# Patient Record
Sex: MALE | Race: BLACK OR AFRICAN AMERICAN | Employment: OTHER | URBAN - METROPOLITAN AREA
[De-identification: names, ages, dates, MRNs, and addresses within clinical notes are randomized per-mention and may not be internally consistent; named-entity substitution may affect disease eponyms.]

---

## 2018-05-21 ENCOUNTER — HOSPITAL ENCOUNTER (EMERGENCY)
Age: 63
Discharge: HOME | End: 2018-05-21
Attending: EMERGENCY MEDICINE
Payer: COMMERCIAL

## 2018-05-21 ENCOUNTER — APPOINTMENT (OUTPATIENT)
Dept: GENERAL RADIOLOGY | Age: 63
End: 2018-05-21
Attending: EMERGENCY MEDICINE
Payer: COMMERCIAL

## 2018-05-21 VITALS
WEIGHT: 248 LBS | HEIGHT: 69 IN | TEMPERATURE: 98.2 F | SYSTOLIC BLOOD PRESSURE: 107 MMHG | RESPIRATION RATE: 14 BRPM | DIASTOLIC BLOOD PRESSURE: 76 MMHG | BODY MASS INDEX: 36.73 KG/M2 | OXYGEN SATURATION: 97 % | HEART RATE: 106 BPM

## 2018-05-21 DIAGNOSIS — E87.70 HYPERVOLEMIA, UNSPECIFIED HYPERVOLEMIA TYPE: ICD-10-CM

## 2018-05-21 DIAGNOSIS — R06.00 DYSPNEA, UNSPECIFIED TYPE: Primary | ICD-10-CM

## 2018-05-21 DIAGNOSIS — J20.9 ACUTE BRONCHITIS, UNSPECIFIED ORGANISM: ICD-10-CM

## 2018-05-21 LAB
ALBUMIN SERPL-MCNC: 3.8 G/DL (ref 3.4–5)
ALBUMIN/GLOB SERPL: 1.1 {RATIO} (ref 0.8–1.7)
ALP SERPL-CCNC: 24 U/L (ref 45–117)
ALT SERPL-CCNC: 32 U/L (ref 16–61)
ANION GAP SERPL CALC-SCNC: 7 MMOL/L (ref 3–18)
AST SERPL-CCNC: 17 U/L (ref 15–37)
ATRIAL RATE: 100 BPM
BASOPHILS # BLD: 0 K/UL (ref 0–0.06)
BASOPHILS NFR BLD: 1 % (ref 0–2)
BILIRUB DIRECT SERPL-MCNC: 0.1 MG/DL (ref 0–0.2)
BILIRUB SERPL-MCNC: 0.5 MG/DL (ref 0.2–1)
BNP SERPL-MCNC: 3364 PG/ML (ref 0–900)
BUN SERPL-MCNC: 13 MG/DL (ref 7–18)
BUN/CREAT SERPL: 11 (ref 12–20)
CALCIUM SERPL-MCNC: 9.1 MG/DL (ref 8.5–10.1)
CALCULATED P AXIS, ECG09: 58 DEGREES
CALCULATED R AXIS, ECG10: 21 DEGREES
CALCULATED T AXIS, ECG11: 95 DEGREES
CHLORIDE SERPL-SCNC: 106 MMOL/L (ref 100–108)
CK MB CFR SERPL CALC: 0.8 % (ref 0–4)
CK MB SERPL-MCNC: 2.1 NG/ML (ref 5–25)
CK SERPL-CCNC: 248 U/L (ref 39–308)
CO2 SERPL-SCNC: 26 MMOL/L (ref 21–32)
CREAT SERPL-MCNC: 1.15 MG/DL (ref 0.6–1.3)
D DIMER PPP FEU-MCNC: 0.49 UG/ML(FEU)
DIAGNOSIS, 93000: NORMAL
DIFFERENTIAL METHOD BLD: ABNORMAL
EOSINOPHIL # BLD: 0.1 K/UL (ref 0–0.4)
EOSINOPHIL NFR BLD: 2 % (ref 0–5)
ERYTHROCYTE [DISTWIDTH] IN BLOOD BY AUTOMATED COUNT: 12.7 % (ref 11.6–14.5)
GLOBULIN SER CALC-MCNC: 3.4 G/DL (ref 2–4)
GLUCOSE SERPL-MCNC: 98 MG/DL (ref 74–99)
HCT VFR BLD AUTO: 38.7 % (ref 36–48)
HGB BLD-MCNC: 13.6 G/DL (ref 13–16)
LYMPHOCYTES # BLD: 2.1 K/UL (ref 0.9–3.6)
LYMPHOCYTES NFR BLD: 41 % (ref 21–52)
MCH RBC QN AUTO: 30.9 PG (ref 24–34)
MCHC RBC AUTO-ENTMCNC: 35.1 G/DL (ref 31–37)
MCV RBC AUTO: 88 FL (ref 74–97)
MONOCYTES # BLD: 0.5 K/UL (ref 0.05–1.2)
MONOCYTES NFR BLD: 10 % (ref 3–10)
NEUTS SEG # BLD: 2.4 K/UL (ref 1.8–8)
NEUTS SEG NFR BLD: 46 % (ref 40–73)
P-R INTERVAL, ECG05: 168 MS
PLATELET # BLD AUTO: 259 K/UL (ref 135–420)
PMV BLD AUTO: 10.4 FL (ref 9.2–11.8)
POTASSIUM SERPL-SCNC: 4.4 MMOL/L (ref 3.5–5.5)
PROT SERPL-MCNC: 7.2 G/DL (ref 6.4–8.2)
Q-T INTERVAL, ECG07: 376 MS
QRS DURATION, ECG06: 100 MS
QTC CALCULATION (BEZET), ECG08: 485 MS
RBC # BLD AUTO: 4.4 M/UL (ref 4.7–5.5)
SODIUM SERPL-SCNC: 139 MMOL/L (ref 136–145)
TROPONIN I SERPL-MCNC: <0.02 NG/ML (ref 0–0.04)
VENTRICULAR RATE, ECG03: 100 BPM
WBC # BLD AUTO: 5.2 K/UL (ref 4.6–13.2)

## 2018-05-21 PROCEDURE — 83880 ASSAY OF NATRIURETIC PEPTIDE: CPT | Performed by: EMERGENCY MEDICINE

## 2018-05-21 PROCEDURE — 94640 AIRWAY INHALATION TREATMENT: CPT

## 2018-05-21 PROCEDURE — 74011000250 HC RX REV CODE- 250: Performed by: EMERGENCY MEDICINE

## 2018-05-21 PROCEDURE — 93005 ELECTROCARDIOGRAM TRACING: CPT

## 2018-05-21 PROCEDURE — 71045 X-RAY EXAM CHEST 1 VIEW: CPT

## 2018-05-21 PROCEDURE — 80076 HEPATIC FUNCTION PANEL: CPT | Performed by: EMERGENCY MEDICINE

## 2018-05-21 PROCEDURE — 85025 COMPLETE CBC W/AUTO DIFF WBC: CPT | Performed by: EMERGENCY MEDICINE

## 2018-05-21 PROCEDURE — 80048 BASIC METABOLIC PNL TOTAL CA: CPT | Performed by: EMERGENCY MEDICINE

## 2018-05-21 PROCEDURE — 82550 ASSAY OF CK (CPK): CPT | Performed by: EMERGENCY MEDICINE

## 2018-05-21 PROCEDURE — 96374 THER/PROPH/DIAG INJ IV PUSH: CPT

## 2018-05-21 PROCEDURE — 77030029684 HC NEB SM VOL KT MONA -A

## 2018-05-21 PROCEDURE — 74011250636 HC RX REV CODE- 250/636: Performed by: EMERGENCY MEDICINE

## 2018-05-21 PROCEDURE — 99284 EMERGENCY DEPT VISIT MOD MDM: CPT

## 2018-05-21 PROCEDURE — 94762 N-INVAS EAR/PLS OXIMTRY CONT: CPT

## 2018-05-21 PROCEDURE — 85379 FIBRIN DEGRADATION QUANT: CPT | Performed by: EMERGENCY MEDICINE

## 2018-05-21 RX ORDER — FUROSEMIDE 40 MG/1
40 TABLET ORAL DAILY
Qty: 7 TAB | Refills: 0 | Status: SHIPPED | OUTPATIENT
Start: 2018-05-21 | End: 2018-05-28

## 2018-05-21 RX ORDER — FUROSEMIDE 10 MG/ML
40 INJECTION INTRAMUSCULAR; INTRAVENOUS
Status: COMPLETED | OUTPATIENT
Start: 2018-05-21 | End: 2018-05-21

## 2018-05-21 RX ORDER — PREDNISONE 10 MG/1
TABLET ORAL
Qty: 1 PACKAGE | Refills: 0 | Status: SHIPPED | OUTPATIENT
Start: 2018-05-21 | End: 2018-07-13 | Stop reason: ALTCHOICE

## 2018-05-21 RX ORDER — DOXYCYCLINE 100 MG/1
100 CAPSULE ORAL 2 TIMES DAILY
Qty: 14 CAP | Refills: 0 | Status: SHIPPED | OUTPATIENT
Start: 2018-05-21 | End: 2018-05-28

## 2018-05-21 RX ORDER — IPRATROPIUM BROMIDE AND ALBUTEROL SULFATE 2.5; .5 MG/3ML; MG/3ML
3 SOLUTION RESPIRATORY (INHALATION)
Status: COMPLETED | OUTPATIENT
Start: 2018-05-21 | End: 2018-05-21

## 2018-05-21 RX ORDER — ALBUTEROL SULFATE 90 UG/1
2 AEROSOL, METERED RESPIRATORY (INHALATION)
Qty: 1 INHALER | Refills: 0 | Status: SHIPPED | OUTPATIENT
Start: 2018-05-21 | End: 2018-05-26

## 2018-05-21 RX ADMIN — IPRATROPIUM BROMIDE AND ALBUTEROL SULFATE 3 ML: .5; 3 SOLUTION RESPIRATORY (INHALATION) at 13:12

## 2018-05-21 RX ADMIN — FUROSEMIDE 40 MG: 10 INJECTION, SOLUTION INTRAMUSCULAR; INTRAVENOUS at 13:12

## 2018-05-21 NOTE — DISCHARGE INSTRUCTIONS
Shortness of Breath: Care Instructions  Your Care Instructions  Shortness of breath has many causes. Sometimes conditions such as anxiety can lead to shortness of breath. Some people get mild shortness of breath when they exercise. Trouble breathing also can be a symptom of a serious problem, such as asthma, lung disease, emphysema, heart problems, and pneumonia. If your shortness of breath continues, you may need tests and treatment. Watch for any changes in your breathing and other symptoms. Follow-up care is a key part of your treatment and safety. Be sure to make and go to all appointments, and call your doctor if you are having problems. It's also a good idea to know your test results and keep a list of the medicines you take. How can you care for yourself at home? · Do not smoke or allow others to smoke around you. If you need help quitting, talk to your doctor about stop-smoking programs and medicines. These can increase your chances of quitting for good. · Get plenty of rest and sleep. · Take your medicines exactly as prescribed. Call your doctor if you think you are having a problem with your medicine. · Find healthy ways to deal with stress. ¨ Exercise daily. ¨ Get plenty of sleep. ¨ Eat regularly and well. When should you call for help? Call 911 anytime you think you may need emergency care. For example, call if:  ? · You have severe shortness of breath. ? · You have symptoms of a heart attack. These may include:  ¨ Chest pain or pressure, or a strange feeling in the chest.  ¨ Sweating. ¨ Shortness of breath. ¨ Nausea or vomiting. ¨ Pain, pressure, or a strange feeling in the back, neck, jaw, or upper belly or in one or both shoulders or arms. ¨ Lightheadedness or sudden weakness. ¨ A fast or irregular heartbeat. After you call 911, the  may tell you to chew 1 adult-strength or 2 to 4 low-dose aspirin. Wait for an ambulance. Do not try to drive yourself.    ?Call your doctor now or seek immediate medical care if:  ? · Your shortness of breath gets worse or you start to wheeze. Wheezing is a high-pitched sound when you breathe. ? · You wake up at night out of breath or have to prop your head up on several pillows to breathe. ? · You are short of breath after only light activity or while at rest.   ? Watch closely for changes in your health, and be sure to contact your doctor if:  ? · You do not get better over the next 1 to 2 days. Where can you learn more? Go to http://brandt-lore.info/. Enter S780 in the search box to learn more about \"Shortness of Breath: Care Instructions. \"  Current as of: May 12, 2017  Content Version: 11.4  © 2672-4711 [employer]. Care instructions adapted under license by [employer] (which disclaims liability or warranty for this information). If you have questions about a medical condition or this instruction, always ask your healthcare professional. [place] [address] any warranty or liability for your use of this information. Learning About Fluid Overload  What is fluid overload? Fluid overload means that your body has too much water. The extra fluid in your body can raise your blood pressure and force your heart to work harder. It can also make it hard for you to breathe. Most of your body is made up of water. The body uses minerals like sodium and potassium to help organs such as your heart, kidneys, and liver balance how much water you need. For example, the heart pumps blood to move water around the body. And the kidneys work to get rid of the water that the body doesn't need. Health conditions like kidney disease, heart failure, and cirrhosis can cause fluid overload. Other things can cause extra fluid to build up. IV fluids, some medicines, too much salt (sodium) from food, and certain medical treatments can sometimes cause this fluid increase.   What are the symptoms? Some of the most common symptoms are:  · Gaining weight over a short period of time. · Swelling in the ankles or legs. · Shortness of breath. How is it treated? The goal of treatment is to remove the extra fluid in your body. Your treatment will depend on the cause. Your doctor may:  · Give you medicines, such as diuretics (also called \"water pills\"). They help your body get rid of the extra fluid. · Restrict your fluid or salt intake. Follow-up care is a key part of your treatment and safety. Be sure to make and go to all appointments, and call your doctor if you are having problems. It's also a good idea to know your test results and keep a list of the medicines you take. Where can you learn more? Go to http://brandt-lore.info/. Enter O110 in the search box to learn more about \"Learning About Fluid Overload. \"  Current as of: September 21, 2016  Content Version: 11.4  © 6136-1226 [employer]. Care instructions adapted under license by [employer] (which disclaims liability or warranty for this information). If you have questions about a medical condition or this instruction, always ask your healthcare professional. [place] [address] any warranty or liability for your use of this information.

## 2018-05-21 NOTE — ED NOTES
Assumed care of patient at this time, patient states that he feels better call bell within reach no additional wants or needs noted.

## 2018-05-21 NOTE — ED NOTES
I performed a brief evaluation, including history and physical, of the patient here in triage and I have determined that pt will need further treatment and evaluation from the main side ER physician. I have placed initial orders to help in expediting patients care.      May 21, 2018 at 10:38 AM - A[facility]        Visit Vitals    /85 (BP 1 Location: Left arm, BP Patient Position: At rest)    Pulse (!) 106    Temp 98.2 °F (36.8 °C)    Resp 14    Ht 5' 9\" (1.753 m)    Wt 112.5 kg (248 lb)    SpO2 99%    BMI 36.62 kg/m2

## 2018-05-21 NOTE — ED NOTES
Patient given medication per [facility]. Patient denies pain currently call bell within reach no additional wants or needs noted at this time.

## 2018-05-21 NOTE — ED NOTES
Patient is assisted to the bathroom at this time patient's gait is steady patient denies pain. No SOB noted.

## 2018-05-21 NOTE — ED NOTES
I have reviewed discharge instructions with the patient. The patient verbalized understanding. Patient armband removed and shredded.  Patient is made aware to follow-up with provider patient verbalizes understanding

## 2018-05-21 NOTE — ED PROVIDER NOTES
EMERGENCY DEPARTMENT HISTORY AND PHYSICAL EXAM    10:50 AM      Date: 5/21/2018  Patient Name: Marisa Zapata    History of Presenting Illness     Chief Complaint   Patient presents with    Shortness of Breath         History Provided By: Patient    Chief Complaint: SOB  Duration:  1 month  Timing:  Gradual and Worsening  Severity: Moderate  Modifying Factors: Made worse with walking, laying down   Associated Symptoms: dyspnea on exertion, orthopnea, intermittent cough      Additional History (Context): Marisa Zapata is a 60 y.o. male with diabetes who presents with complaints of gradually worsening moderate SOB onset the approximately one month. The pt stated that he has trouble sleeping. He says that he just \"dozes\" off and then is startled out of his sleep from trying to catch his breath. He reports dyspnea on exertion and can walk approximately 50 yards before the SOB occurs. He noted that when he inhales sometimes he hears a \"gurgling\" sound and has an intermittent cough productive of yellow sputum. He is not experiencing any nausea, vomiting, or diaphoresis during the SOB episodes. Patient is a former smoker and denies alcohol use. PCP: Xiomara Salazar MD    Current Outpatient Prescriptions   Medication Sig Dispense Refill    predniSONE (STERAPRED DS) 10 mg dose pack 6 day dose pack per package instructions 1 Package 0    albuterol (PROVENTIL HFA, VENTOLIN HFA, PROAIR HFA) 90 mcg/actuation inhaler Take 2 Puffs by inhalation every four (4) hours as needed for Wheezing or Shortness of Breath for up to 5 days. 1 Inhaler 0    furosemide (LASIX) 40 mg tablet Take 1 Tab by mouth daily for 7 days. 7 Tab 0    doxycycline (MONODOX) 100 mg capsule Take 1 Cap by mouth two (2) times a day for 7 days. 14 Cap 0    acetaminophen (TYLENOL ARTHRITIS PAIN) 650 mg CR tablet Take 650 mg by mouth daily.  Take 2 daily      metFORMIN (GLUCOPHAGE) 1,000 mg tablet Take 1,000 mg by mouth two (2) times daily (with meals).  simvastatin (ZOCOR) 20 mg tablet Take  by mouth nightly. Past History     Past Medical History:  Past Medical History:   Diagnosis Date    Arthritis     Nonspecific abnormal electrocardiogram (ECG) (EKG)     Obesity, unspecified     Other and unspecified hyperlipidemia     Other primary cardiomyopathies     Shortness of breath     Type II or unspecified type diabetes mellitus without mention of complication, not stated as uncontrolled        Past Surgical History:  History reviewed. No pertinent surgical history. Family History:  History reviewed. No pertinent family history. Social History:  Social History   Substance Use Topics    Smoking status: Former Smoker     Quit date: 12/6/1998    Smokeless tobacco: Never Used    Alcohol use No      Comment: former heavy drinker       Allergies:  No Known Allergies      Review of Systems     Review of Systems   Constitutional: Negative for activity change, chills, fatigue and fever. HENT: Negative for congestion, rhinorrhea and sneezing. Eyes: Negative for visual disturbance. Respiratory: Positive for cough (intermittent, productive, yellow sputum) and shortness of breath. Negative for chest tightness. Cardiovascular: Negative for chest pain and palpitations. Gastrointestinal: Negative for abdominal pain, diarrhea, nausea and vomiting. Genitourinary: Negative for dysuria and hematuria. Musculoskeletal: Negative for back pain and neck pain. Skin: Negative for rash. Neurological: Negative for dizziness, weakness, light-headedness and numbness. All other systems reviewed and are negative. Physical Exam     Visit Vitals    /85 (BP 1 Location: Left arm, BP Patient Position: At rest)    Pulse (!) 106    Temp 98.2 °F (36.8 °C)    Resp 14    Ht 5' 9\" (1.753 m)    Wt 112.5 kg (248 lb)    SpO2 99%    BMI 36.62 kg/m2     Physical Exam   Constitutional: He is oriented to person, place, and time.  He appears well-developed and well-nourished. No distress. No distress, eating chips and non diet soda    HENT:   Head: Normocephalic and atraumatic. Right Ear: External ear normal.   Left Ear: External ear normal.   Nose: Nose normal.   Mouth/Throat: Oropharynx is clear and moist.   Eyes: Conjunctivae and EOM are normal. Pupils are equal, round, and reactive to light. No scleral icterus. Neck: Normal range of motion. Neck supple. No JVD present. No tracheal deviation present. No thyromegaly present. Cardiovascular: Regular rhythm and intact distal pulses. Exam reveals gallop. Exam reveals no friction rub. No murmur heard. Tachy ? S3   Pulmonary/Chest: Effort normal and breath sounds normal. He exhibits no tenderness. Abdominal: Soft. Bowel sounds are normal. He exhibits no distension. There is no tenderness. There is no rebound and no guarding. Musculoskeletal: Normal range of motion. He exhibits no edema or tenderness. Lymphadenopathy:     He has no cervical adenopathy. Neurological: He is alert and oriented to person, place, and time. No cranial nerve deficit. Coordination normal.   No sensory loss, Gait normal, Motor 5/5   Skin: Skin is warm and dry. Psychiatric: He has a normal mood and affect. His behavior is normal. Judgment and thought content normal.   Nursing note and vitals reviewed. Diagnostic Study Results     Labs -  Recent Results (from the past 12 hour(s))   CBC WITH AUTOMATED DIFF    Collection Time: 05/21/18 11:01 AM   Result Value Ref Range    WBC 5.2 4.6 - 13.2 K/uL    RBC 4.40 (L) 4.70 - 5.50 M/uL    HGB 13.6 13.0 - 16.0 g/dL    HCT 38.7 36.0 - 48.0 %    MCV 88.0 74.0 - 97.0 FL    MCH 30.9 24.0 - 34.0 PG    MCHC 35.1 31.0 - 37.0 g/dL    RDW 12.7 11.6 - 14.5 %    PLATELET 211 938 - 511 K/uL    MPV 10.4 9.2 - 11.8 FL    NEUTROPHILS 46 40 - 73 %    LYMPHOCYTES 41 21 - 52 %    MONOCYTES 10 3 - 10 %    EOSINOPHILS 2 0 - 5 %    BASOPHILS 1 0 - 2 %    ABS.  NEUTROPHILS 2.4 1.8 - 8.0 K/UL    ABS. LYMPHOCYTES 2.1 0.9 - 3.6 K/UL    ABS. MONOCYTES 0.5 0.05 - 1.2 K/UL    ABS. EOSINOPHILS 0.1 0.0 - 0.4 K/UL    ABS. BASOPHILS 0.0 0.0 - 0.06 K/UL    DF AUTOMATED     METABOLIC PANEL, BASIC    Collection Time: 05/21/18 11:01 AM   Result Value Ref Range    Sodium 139 136 - 145 mmol/L    Potassium 4.4 3.5 - 5.5 mmol/L    Chloride 106 100 - 108 mmol/L    CO2 26 21 - 32 mmol/L    Anion gap 7 3.0 - 18 mmol/L    Glucose 98 74 - 99 mg/dL    BUN 13 7.0 - 18 MG/DL    Creatinine 1.15 0.6 - 1.3 MG/DL    BUN/Creatinine ratio 11 (L) 12 - 20      GFR est AA >60 >60 ml/min/1.73m2    GFR est non-AA >60 >60 ml/min/1.73m2    Calcium 9.1 8.5 - 10.1 MG/DL   CARDIAC PANEL,(CK, CKMB & TROPONIN)    Collection Time: 05/21/18 11:01 AM   Result Value Ref Range     39 - 308 U/L    CK - MB 2.1 <3.6 ng/ml    CK-MB Index 0.8 0.0 - 4.0 %    Troponin-I, Qt. <0.02 0.0 - 0.045 NG/ML   NT-PRO BNP    Collection Time: 05/21/18 11:01 AM   Result Value Ref Range    NT pro-BNP 3364 (H) 0 - 900 PG/ML   HEPATIC FUNCTION PANEL    Collection Time: 05/21/18 11:01 AM   Result Value Ref Range    Protein, total 7.2 6.4 - 8.2 g/dL    Albumin 3.8 3.4 - 5.0 g/dL    Globulin 3.4 2.0 - 4.0 g/dL    A-G Ratio 1.1 0.8 - 1.7      Bilirubin, total 0.5 0.2 - 1.0 MG/DL    Bilirubin, direct 0.1 0.0 - 0.2 MG/DL    Alk.  phosphatase 24 (L) 45 - 117 U/L    AST (SGOT) 17 15 - 37 U/L    ALT (SGPT) 32 16 - 61 U/L   D DIMER    Collection Time: 05/21/18 11:01 AM   Result Value Ref Range    D DIMER 0.49 (H) <0.46 ug/ml(FEU)   EKG, 12 LEAD, INITIAL    Collection Time: 05/21/18 11:37 AM   Result Value Ref Range    Ventricular Rate 100 BPM    Atrial Rate 100 BPM    P-R Interval 168 ms    QRS Duration 100 ms    Q-T Interval 376 ms    QTC Calculation (Bezet) 485 ms    Calculated P Axis 58 degrees    Calculated R Axis 21 degrees    Calculated T Axis 95 degrees    Diagnosis       Normal sinus rhythm  Possible Left atrial enlargement  T wave abnormality, consider anterolateral ischemia  Prolonged QT  Abnormal ECG  When compared with ECG of 11-SEP-2012 15:39,  T wave inversion now evident in Anterior leads  QT has lengthened  Confirmed by Ana Laura Castro MD, --- ([id number]) on 5/21/2018 12:58:08 PM         Radiologic Studies -   XR CHEST PORT   Final Result      XR CHEST PORT  IMPRESSION:      Normal cardiac size without cardiac decompensation.     Increased right perihilar markings, consistent with perihilar infiltrates. Aspiration pneumonitis not excluded    Medical Decision Making   I am the first provider for this patient. I reviewed the vital signs, available nursing notes, past medical history, past surgical history, family history and social history. Vital Signs-Reviewed the patient's vital signs. Pulse Oximetry Analysis -  99% on room air   EKG: Interpreted by the EP. Time Interpreted: 11:37AM   Rate: 100   Rhythm: Normal Sinus Rhythm    Interpretation: Increased QTC, T-wave inversions anterior lateral leads. No STEMI     Records Reviewed: Nursing Notes (Time of Review: 10:50 AM)    ED Course: Progress Notes, Reevaluation, and Consults:  1:20 PM Pt is feeling better with breathing treatment. 3:25 PM Feeling better after 1000mL of diuresis and would like to go home. Will start him on Lasix for 7 days, as well as an inhaler, antibiotics and Prednisone. Patient should follow up with Dr. John Lee and Dr. Jeanette Juarez this week. Have instructed him to return if symptoms worsen or with concern. Provider Notes (Medical Decision Making): Pt is a 60yo male with a hx of cardiomyopathy but last echo with EF 33% with diastolic dysfunction, dyslipidemia, DM presents to the ED with complaint of dyspnea that has been progressive with CARPENTER, orthopnea. Pt HR is elevated and will follow cardiac labs, CXR. Diagnosis     Clinical Impression:   1. Dyspnea, unspecified type    2. Acute bronchitis, unspecified organism    3.  Hypervolemia, unspecified hypervolemia type Disposition: Discharged    Follow-up Information     Follow up With Details Comments Contact Info    [facility] EMERGENCY DEPT  As needed, If symptoms worsen [address] [facility]    Maykel Garza MD Schedule an appointment as soon as possible for a visit in 2 days  [address]      Murray Lew MD Schedule an appointment as soon as possible for a visit in 2 days  [address] [place] [address] [address] [place] [id number]  [phone]             Patient's Medications   Start Taking    ALBUTEROL (PROVENTIL HFA, VENTOLIN HFA, PROAIR HFA) 90 MCG/ACTUATION INHALER    Take 2 Puffs by inhalation every four (4) hours as needed for Wheezing or Shortness of Breath for up to 5 days. DOXYCYCLINE (MONODOX) 100 MG CAPSULE    Take 1 Cap by mouth two (2) times a day for 7 days. FUROSEMIDE (LASIX) 40 MG TABLET    Take 1 Tab by mouth daily for 7 days. PREDNISONE (STERAPRED DS) 10 MG DOSE PACK    6 day dose pack per package instructions   Continue Taking    ACETAMINOPHEN (TYLENOL ARTHRITIS PAIN) 650 MG CR TABLET    Take 650 mg by mouth daily. Take 2 daily    METFORMIN (GLUCOPHAGE) 1,000 MG TABLET    Take 1,000 mg by mouth two (2) times daily (with meals). SIMVASTATIN (ZOCOR) 20 MG TABLET    Take  by mouth nightly.    These Medications have changed    No medications on file   Stop Taking    No medications on file     _______________________________    Attestations:  [address] [place] [address] acting as a scribe for and in the presence of Ayala Bush MD      May 21, 2018 at 10:50 AM    Scribe Attestation:     Suzie Nolan, acting as a scribe for and in the presence of Ayala Bush MD      May 21, 2018 at 11:06 AM        Provider Attestation:      I personally performed the services described in the documentation, reviewed the documentation, as recorded by the scribe in my presence, and it accurately and completely records my words and actions.  May 21, 2018 at 10:50 AM - Jessica German MD    _______________________________

## 2018-05-21 NOTE — ED TRIAGE NOTES
The patient ambulated into Triage, eating chips and drinking a soda. States, \"I'm having shortness of breath. It started last week. \"

## 2018-05-21 NOTE — ED NOTES
Pt states \"It's funny, I have been feeling so terrible, but when I walked into here, I now feel fine\".

## 2018-06-22 ENCOUNTER — HOSPITAL ENCOUNTER (EMERGENCY)
Age: 63
Discharge: HOME | End: 2018-06-22
Attending: EMERGENCY MEDICINE
Payer: COMMERCIAL

## 2018-06-22 ENCOUNTER — APPOINTMENT (OUTPATIENT)
Dept: GENERAL RADIOLOGY | Age: 63
End: 2018-06-22
Attending: EMERGENCY MEDICINE
Payer: COMMERCIAL

## 2018-06-22 VITALS
WEIGHT: 210 LBS | RESPIRATION RATE: 20 BRPM | SYSTOLIC BLOOD PRESSURE: 121 MMHG | HEIGHT: 69 IN | DIASTOLIC BLOOD PRESSURE: 82 MMHG | OXYGEN SATURATION: 100 % | TEMPERATURE: 98.3 F | BODY MASS INDEX: 31.1 KG/M2 | HEART RATE: 92 BPM

## 2018-06-22 DIAGNOSIS — I50.9 ACUTE ON CHRONIC CONGESTIVE HEART FAILURE, UNSPECIFIED HEART FAILURE TYPE (HCC): Primary | ICD-10-CM

## 2018-06-22 DIAGNOSIS — R06.01 ORTHOPNEA: ICD-10-CM

## 2018-06-22 LAB
ANION GAP SERPL CALC-SCNC: 4 MMOL/L (ref 3–18)
ATRIAL RATE: 94 BPM
BASOPHILS # BLD: 0 K/UL (ref 0–0.06)
BASOPHILS NFR BLD: 1 % (ref 0–2)
BNP SERPL-MCNC: 4730 PG/ML (ref 0–900)
BUN SERPL-MCNC: 12 MG/DL (ref 7–18)
BUN/CREAT SERPL: 10 (ref 12–20)
CALCIUM SERPL-MCNC: 8.3 MG/DL (ref 8.5–10.1)
CALCULATED P AXIS, ECG09: 64 DEGREES
CALCULATED R AXIS, ECG10: 24 DEGREES
CALCULATED T AXIS, ECG11: 75 DEGREES
CHLORIDE SERPL-SCNC: 109 MMOL/L (ref 100–108)
CO2 SERPL-SCNC: 29 MMOL/L (ref 21–32)
CREAT SERPL-MCNC: 1.22 MG/DL (ref 0.6–1.3)
DIAGNOSIS, 93000: NORMAL
DIFFERENTIAL METHOD BLD: ABNORMAL
EOSINOPHIL # BLD: 0.1 K/UL (ref 0–0.4)
EOSINOPHIL NFR BLD: 4 % (ref 0–5)
ERYTHROCYTE [DISTWIDTH] IN BLOOD BY AUTOMATED COUNT: 12.8 % (ref 11.6–14.5)
GLUCOSE SERPL-MCNC: 179 MG/DL (ref 74–99)
HCT VFR BLD AUTO: 35.5 % (ref 36–48)
HGB BLD-MCNC: 12.2 G/DL (ref 13–16)
LYMPHOCYTES # BLD: 1.8 K/UL (ref 0.9–3.6)
LYMPHOCYTES NFR BLD: 43 % (ref 21–52)
MCH RBC QN AUTO: 30.2 PG (ref 24–34)
MCHC RBC AUTO-ENTMCNC: 34.4 G/DL (ref 31–37)
MCV RBC AUTO: 87.9 FL (ref 74–97)
MONOCYTES # BLD: 0.4 K/UL (ref 0.05–1.2)
MONOCYTES NFR BLD: 11 % (ref 3–10)
NEUTS SEG # BLD: 1.6 K/UL (ref 1.8–8)
NEUTS SEG NFR BLD: 41 % (ref 40–73)
P-R INTERVAL, ECG05: 176 MS
PLATELET # BLD AUTO: 195 K/UL (ref 135–420)
PMV BLD AUTO: 10 FL (ref 9.2–11.8)
POTASSIUM SERPL-SCNC: 3.9 MMOL/L (ref 3.5–5.5)
Q-T INTERVAL, ECG07: 404 MS
QRS DURATION, ECG06: 94 MS
QTC CALCULATION (BEZET), ECG08: 505 MS
RBC # BLD AUTO: 4.04 M/UL (ref 4.7–5.5)
SODIUM SERPL-SCNC: 142 MMOL/L (ref 136–145)
TROPONIN I SERPL-MCNC: <0.02 NG/ML (ref 0–0.04)
VENTRICULAR RATE, ECG03: 94 BPM
WBC # BLD AUTO: 4 K/UL (ref 4.6–13.2)

## 2018-06-22 PROCEDURE — 83880 ASSAY OF NATRIURETIC PEPTIDE: CPT | Performed by: EMERGENCY MEDICINE

## 2018-06-22 PROCEDURE — 74011250637 HC RX REV CODE- 250/637: Performed by: EMERGENCY MEDICINE

## 2018-06-22 PROCEDURE — 80048 BASIC METABOLIC PNL TOTAL CA: CPT | Performed by: EMERGENCY MEDICINE

## 2018-06-22 PROCEDURE — 93005 ELECTROCARDIOGRAM TRACING: CPT

## 2018-06-22 PROCEDURE — 85025 COMPLETE CBC W/AUTO DIFF WBC: CPT | Performed by: EMERGENCY MEDICINE

## 2018-06-22 PROCEDURE — 74011250636 HC RX REV CODE- 250/636: Performed by: EMERGENCY MEDICINE

## 2018-06-22 PROCEDURE — 99284 EMERGENCY DEPT VISIT MOD MDM: CPT

## 2018-06-22 PROCEDURE — 84484 ASSAY OF TROPONIN QUANT: CPT | Performed by: EMERGENCY MEDICINE

## 2018-06-22 PROCEDURE — 71046 X-RAY EXAM CHEST 2 VIEWS: CPT

## 2018-06-22 PROCEDURE — 96374 THER/PROPH/DIAG INJ IV PUSH: CPT

## 2018-06-22 RX ORDER — FUROSEMIDE 10 MG/ML
20 INJECTION INTRAMUSCULAR; INTRAVENOUS
Status: COMPLETED | OUTPATIENT
Start: 2018-06-22 | End: 2018-06-22

## 2018-06-22 RX ORDER — GUAIFENESIN 100 MG/5ML
162 LIQUID (ML) ORAL
Status: COMPLETED | OUTPATIENT
Start: 2018-06-22 | End: 2018-06-22

## 2018-06-22 RX ORDER — FUROSEMIDE 40 MG/1
40 TABLET ORAL DAILY
Qty: 20 TAB | Refills: 0 | Status: SHIPPED | OUTPATIENT
Start: 2018-06-22 | End: 2018-07-12

## 2018-06-22 RX ORDER — GUAIFENESIN 100 MG/5ML
81 LIQUID (ML) ORAL DAILY
Qty: 30 TAB | Refills: 0 | Status: SHIPPED | OUTPATIENT
Start: 2018-06-22

## 2018-06-22 RX ADMIN — ASPIRIN 81 MG CHEWABLE TABLET 162 MG: 81 TABLET CHEWABLE at 10:59

## 2018-06-22 RX ADMIN — FUROSEMIDE 20 MG: 10 INJECTION, SOLUTION INTRAMUSCULAR; INTRAVENOUS at 10:58

## 2018-06-22 NOTE — DISCHARGE INSTRUCTIONS
Heart Failure: Care Instructions  Your Care Instructions    Heart failure occurs when your heart does not pump as much blood as the body needs. Failure does not mean that the heart has stopped pumping but rather that it is not pumping as well as it should. Over time, this causes fluid buildup in your lungs and other parts of your body. Fluid buildup can cause shortness of breath, fatigue, swollen ankles, and other problems. By taking medicines regularly, reducing sodium (salt) in your diet, checking your weight every day, and making lifestyle changes, you can feel better and live longer. Follow-up care is a key part of your treatment and safety. Be sure to make and go to all appointments, and call your doctor if you are having problems. It's also a good idea to know your test results and keep a list of the medicines you take. How can you care for yourself at home? Medicines  ? · Be safe with medicines. Take your medicines exactly as prescribed. Call your doctor if you think you are having a problem with your medicine. ? · Do not take any vitamins, over-the-counter medicine, or herbal products without talking to your doctor first. Georgette Guerra not take ibuprofen (Advil or Motrin) and naproxen (Aleve) without talking to your doctor first. They could make your heart failure worse. ? · You may be taking some of the following medicine. ¨ Beta-blockers can slow heart rate, decrease blood pressure, and improve your condition. Taking a beta-blocker may lower your chance of needing to be hospitalized. ¨ Angiotensin-converting enzyme inhibitors (ACEIs) reduce the heart's workload, lower blood pressure, and reduce swelling. Taking an ACEI may lower your chance of needing to be hospitalized again. ¨ Angiotensin II receptor blockers (ARBs) work like ACEIs. Your doctor may prescribe them instead of ACEIs. ¨ Diuretics, also called water pills, reduce swelling.   ¨ Potassium supplements replace this important mineral, which is sometimes lost with diuretics. ¨ Aspirin and other blood thinners prevent blood clots, which can cause a stroke or heart attack. ? You will get more details on the specific medicines your doctor prescribes. Diet  ? · Your doctor may suggest that you limit sodium to 2,000 milligrams (mg) a day or less. That is less than 1 teaspoon of salt a day, including all the salt you eat in cooking or in packaged foods. People get most of their sodium from processed foods. Fast food and restaurant meals also tend to be very high in sodium. ? · Ask your doctor how much liquid you can drink each day. You may have to limit liquids. ?Weight  ? · Weigh yourself without clothing at the same time each day. Record your weight. Call your doctor if you have a sudden weight gain, such as more than 2 to 3 pounds in a day or 5 pounds in a week. (Your doctor may suggest a different range of weight gain.) A sudden weight gain may mean that your heart failure is getting worse. ? Activity level  ? · Start light exercise (if your doctor says it is okay). Even if you can only do a small amount, exercise will help you get stronger, have more energy, and manage your weight and your stress. Walking is an easy way to get exercise. Start out by walking a little more than you did before. Bit by bit, increase the amount you walk. ? · When you exercise, watch for signs that your heart is working too hard. You are pushing yourself too hard if you cannot talk while you are exercising. If you become short of breath or dizzy or have chest pain, stop, sit down, and rest.   ? · If you feel \"wiped out\" the day after you exercise, walk slower or for a shorter distance until you can work up to a better pace. ? · Get enough rest at night. Sleeping with 1 or 2 pillows under your upper body and head may help you breathe easier. ? Lifestyle changes  ? · Do not smoke. Smoking can make a heart condition worse.  If you need help quitting, talk to your doctor about stop-smoking programs and medicines. These can increase your chances of quitting for good. Quitting smoking may be the most important step you can take to protect your heart. ? · Limit alcohol to 2 drinks a day for men and 1 drink a day for women. Too much alcohol can cause health problems. ? · Avoid getting sick from colds and the flu. Get a pneumococcal vaccine shot. If you have had one before, ask your doctor whether you need another dose. Get a flu shot each year. If you must be around people with colds or the flu, wash your hands often. When should you call for help? Call 911 if you have symptoms of sudden heart failure such as:  ? · You have severe trouble breathing. ? · You cough up pink, foamy mucus. ? · You have a new irregular or rapid heartbeat. ?Call your doctor now or seek immediate medical care if:  ? · You have new or increased shortness of breath. ? · You are dizzy or lightheaded, or you feel like you may faint. ? · You have sudden weight gain, such as more than 2 to 3 pounds in a day or 5 pounds in a week. (Your doctor may suggest a different range of weight gain.)   ? · You have increased swelling in your legs, ankles, or feet. ? · You are suddenly so tired or weak that you cannot do your usual activities. ? Watch closely for changes in your health, and be sure to contact your doctor if you develop new symptoms. Where can you learn more? Go to http://brandt-lore.info/. Enter Y975 in the search box to learn more about \"Heart Failure: Care Instructions. \"  Current as of: September 21, 2016  Content Version: 11.4  © 2719-6959 [employer]. Care instructions adapted under license by [employer] (which disclaims liability or warranty for this information).  If you have questions about a medical condition or this instruction, always ask your healthcare professional. [address]  any warranty or liability for your use of this information.

## 2018-07-13 ENCOUNTER — OFFICE VISIT (OUTPATIENT)
Dept: CARDIOLOGY CLINIC | Age: 63
End: 2018-07-13

## 2018-07-13 VITALS
HEART RATE: 97 BPM | HEIGHT: 69 IN | SYSTOLIC BLOOD PRESSURE: 95 MMHG | BODY MASS INDEX: 31.25 KG/M2 | DIASTOLIC BLOOD PRESSURE: 64 MMHG | WEIGHT: 211 LBS

## 2018-07-13 DIAGNOSIS — R94.31 ABNORMAL EKG: ICD-10-CM

## 2018-07-13 DIAGNOSIS — E11.9 TYPE 2 DIABETES MELLITUS WITHOUT COMPLICATION, WITHOUT LONG-TERM CURRENT USE OF INSULIN (HCC): ICD-10-CM

## 2018-07-13 DIAGNOSIS — I42.9 CARDIOMYOPATHY, UNSPECIFIED TYPE (HCC): ICD-10-CM

## 2018-07-13 DIAGNOSIS — R06.02 SOB (SHORTNESS OF BREATH): Primary | ICD-10-CM

## 2018-07-13 DIAGNOSIS — E78.5 HYPERLIPIDEMIA, UNSPECIFIED HYPERLIPIDEMIA TYPE: ICD-10-CM

## 2018-07-13 DIAGNOSIS — R07.9 CHEST PAIN, UNSPECIFIED TYPE: ICD-10-CM

## 2018-07-13 DIAGNOSIS — R79.89 ELEVATED BRAIN NATRIURETIC PEPTIDE (BNP) LEVEL: ICD-10-CM

## 2018-07-13 RX ORDER — FUROSEMIDE 40 MG/1
40 TABLET ORAL DAILY
COMMUNITY

## 2018-07-13 NOTE — PROGRESS NOTES
HISTORY OF PRESENT ILLNESS  Shoaib Roblero is a 60 y.o. male. New Patient   The history is provided by the patient. Associated symptoms include chest pain and shortness of breath. Pertinent negatives include no abdominal pain and no headaches. Chest Pain    The history is provided by the patient. This is a new problem. Episode onset: 6 month. The problem occurs every several days. The pain is associated with exertion and rest. The pain is present in the substernal region. The pain is mild. The quality of the pain is described as tightness and heavy. The pain does not radiate. Associated symptoms include shortness of breath. Pertinent negatives include no abdominal pain, no claudication, no cough, no dizziness, no fever, no headaches, no hemoptysis, no nausea, no orthopnea, no palpitations, no PND, no sputum production, no vomiting and no weakness. Shortness of Breath   The history is provided by the patient. This is a new problem. The problem occurs intermittently. The problem has not changed since onset. Associated symptoms include chest pain. Pertinent negatives include no fever, no headaches, no cough, no sputum production, no hemoptysis, no wheezing, no PND, no orthopnea, no vomiting, no abdominal pain, no rash, no leg swelling and no claudication. The problem's precipitants include exercise. Review of Systems   Constitutional: Negative for chills and fever. HENT: Negative for nosebleeds. Eyes: Negative for blurred vision and double vision. Respiratory: Positive for shortness of breath. Negative for cough, hemoptysis, sputum production and wheezing. Cardiovascular: Positive for chest pain. Negative for palpitations, orthopnea, claudication, leg swelling and PND. Gastrointestinal: Negative for abdominal pain, heartburn, nausea and vomiting. Musculoskeletal: Negative for myalgias. Skin: Negative for rash. Neurological: Negative for dizziness, weakness and headaches. Endo/Heme/Allergies: Does not bruise/bleed easily. Family History   Problem Relation Age of Onset    Heart Disease Mother     No Known Problems Father     Heart Disease Brother      enlarged heart       Past Medical History:   Diagnosis Date    Arthritis     Cardiomyopathy ([facility] 7/13/2018    EF reported 45-50% in 2010    Chest pain 7/13/2018    Will anginal pain. Less likely GERD chest wall    Hyperlipidemia 7/13/2018    On statin. Lab with PCP    Nonspecific abnormal electrocardiogram (ECG) (EKG)     Obesity, unspecified     Other and unspecified hyperlipidemia     Other primary cardiomyopathies     Shortness of breath     SOB (shortness of breath) 7/13/2018    Possible CHF, HCVD, angina equivalent.  Type 2 diabetes mellitus without complication, without long-term current use of insulin ([facility] [address] [address] 7/13/2018    On metformin. Lab with PCP    Type II or unspecified type diabetes mellitus without mention of complication, not stated as uncontrolled        History reviewed. No pertinent surgical history. Social History   Substance Use Topics    Smoking status: Former Smoker     Quit date: 12/6/1998    Smokeless tobacco: Never Used    Alcohol use No      Comment: former heavy drinker       No Known Allergies    Prior to Admission medications    Medication Sig Start Date End Date Taking? Authorizing Provider   furosemide (LASIX) 20 mg tablet Take  by mouth daily. Yes Historical Provider   aspirin 81 mg chewable tablet Take 1 Tab by mouth daily. 6/22/18  Yes Miles Hansen MD   metFORMIN (GLUCOPHAGE) 1,000 mg tablet Take 1,000 mg by mouth two (2) times daily (with meals). Yes Historical Provider   simvastatin (ZOCOR) 20 mg tablet Take  by mouth nightly. Yes Historical Provider   furosemide (LASIX) 40 mg tablet Take 1 Tab by mouth daily for 20 days.  Indications: 1/2 tab po bid for 3 days, then 1/2 tab po daily 6/22/18 7/12/18  Miles Hansen MD         Visit Vitals    BP 95/64    Pulse 97  Ht 5' 9\" (1.753 m)    Wt 95.7 kg (211 lb)    BMI 31.16 kg/m2         Physical Exam   Constitutional: He is oriented to person, place, and time. He appears well-developed and well-nourished. HENT:   Head: Normocephalic and atraumatic. Left eye blindness   Eyes: Conjunctivae are normal.   Neck: Neck supple. No JVD present. No tracheal deviation present. No thyromegaly present. Cardiovascular: Normal rate, regular rhythm and normal heart sounds. Exam reveals no gallop and no friction rub. No murmur heard. Pulmonary/Chest: No respiratory distress. He has no wheezes. He has rales. He exhibits no tenderness. Abdominal: Soft. There is no tenderness. Musculoskeletal: He exhibits no edema. Neurological: He is alert and oriented to person, place, and time. stuttering   Skin: Skin is warm and dry. Psychiatric: He has a normal mood and affect. Mr. Brigid Oakes has a reminder for a \"due or due soon\" health maintenance. I have asked that he contact his primary care provider for follow-up on this health maintenance. 6/2018  I have personally reviewed patient's records available from hospital and other providers and incorporated findings in patient care. I have personally reviewed patients ekg done at other facility. I Have personally reviewed recent relevant labs available and discussed with patient    Assessment         ICD-10-CM ICD-9-CM    1. SOB (shortness of breath) R06.02 786.05 ECHO ADULT COMPLETE      NUCLEAR CARDIAC STRESS TEST    Possible CHF, HCVD, angina equivalent. 2. Chest pain, unspecified type R07.9 786.50 ECHO ADULT COMPLETE      NUCLEAR CARDIAC STRESS TEST    Will anginal pain. Less likely GERD chest wall   3. Type 2 diabetes mellitus without complication, without long-term current use of insulin (HCC) E11.9 250.00     On metformin. Lab with PCP   4. Hyperlipidemia, unspecified hyperlipidemia type E78.5 272.4     On statin. Lab with PCP   5.  Abnormal EKG R94.31 794.31 Nonspecific changes. Possible HCVD, ischemia   6. Elevated brain natriuretic peptide (BNP) level R79.89 790.99     2 separate occasions recently. Possible cardiomyopathy possible CHF   7. Cardiomyopathy, unspecified type ([facility] I42.9 425.4     EF reported 45-50% in 2010 7/2018  Symptoms suggestive of congestive heart failure. Continue with the Lasix. Have not added any ACE inhibitor due to low blood pressure. Will get echo and stress test.  Further plans based on above    Medications Discontinued During This Encounter   Medication Reason    predniSONE (STERAPRED DS) 10 mg dose pack Therapy Completed    acetaminophen (TYLENOL ARTHRITIS PAIN) 650 mg CR tablet Not A Current Medication       No orders of the defined types were placed in this encounter. Follow-up Disposition:  Return for F/u after tests.

## 2018-07-13 NOTE — LETTER
Renan Ralph 1955 7/13/2018 Dear Angelica Patiño MD 
 
I had the pleasure of evaluating  Mr. Kim Daniel in office today. Below are the relevant portions of my assessment and plan of care. ICD-10-CM ICD-9-CM 1. SOB (shortness of breath) R06.02 786.05 ECHO ADULT COMPLETE  
   NUCLEAR CARDIAC STRESS TEST Possible CHF, HCVD, angina equivalent. 2. Chest pain, unspecified type R07.9 786.50 ECHO ADULT COMPLETE  
   NUCLEAR CARDIAC STRESS TEST Will anginal pain. Less likely GERD chest wall 3. Type 2 diabetes mellitus without complication, without long-term current use of insulin (HCC) E11.9 250.00 On metformin. Lab with PCP 4. Hyperlipidemia, unspecified hyperlipidemia type E78.5 272.4 On statin. Lab with PCP 5. Abnormal EKG R94.31 794.31 Nonspecific changes. Possible HCVD, ischemia 6. Elevated brain natriuretic peptide (BNP) level R79.89 790.99   
 2 separate occasions recently. Possible cardiomyopathy possible CHF 7. Cardiomyopathy, unspecified type ([facility] I42.9 425.4 EF reported 45-50% in 2010 Current Outpatient Prescriptions Medication Sig Dispense Refill  furosemide (LASIX) 20 mg tablet Take  by mouth daily.  aspirin 81 mg chewable tablet Take 1 Tab by mouth daily. 30 Tab 0  
 metFORMIN (GLUCOPHAGE) 1,000 mg tablet Take 1,000 mg by mouth two (2) times daily (with meals).  simvastatin (ZOCOR) 20 mg tablet Take  by mouth nightly. Orders Placed This Encounter  furosemide (LASIX) 20 mg tablet Sig: Take  by mouth daily. If you have questions, please do not hesitate to call me. I look forward to following Mr. Kim Daniel along with you. Sincerely, Stoney Hamman, MD

## 2018-07-13 NOTE — MR AVS SNAPSHOT
Sunshinemaximus Monserrat 
 
 
 [address], [address] [facility] [address] 
[phone] Patient: Rochelle Spann MRN: [id number] Visit Information Date & Time Provider Department Dept. Phone Encounter #  
 7/13/2018 11:45 AM Cande Presley MD [facility] [address] [place] [address] [id number] Follow-up Instructions Return for F/u after tests. Your Appointments 8/3/2018 10:45 AM  
PROCEDURE with [place] ECHO [facility] [place] [facility] Appt Note: holloway [address], [address] [facility] [address]  
[address], [address] [place] [address] 8/10/2018  7:30 AM  
PROCEDURE with CA NUC [facility] [place] ([facility] Appt Note: holloway wt [address], [address] [place] [address]  
[address], [address] [place] [address] 8/15/2018  9:30 AM  
Office Visit with Cande Presley MD  
[facility]) Appt Note: post nuc/echo [address], [address] [place] [address]  
[address], [address] [place] [address] Upcoming Health Maintenance Date Due Hepatitis C Screening 1955 Pneumococcal 19-64 Medium Risk (1 of 1 - PPSV23) 11/18/1973 DTaP/Tdap/Td series (1 - Tdap) 11/18/1975 FOBT Q 1 YEAR AGE 50-75 11/18/2004 ZOSTER VACCINE AGE 60> 9/18/2014 Influenza Age 5 to Adult 8/1/2018 Allergies as of 7/13/2018  Review Complete On: 7/13/2018 By: Cande Presley MD  
 No Known Allergies Current Immunizations  Never Reviewed No immunizations on file. Not reviewed this visit You Were Diagnosed With   
  
 Codes Comments SOB (shortness of breath)    -  Primary ICD-10-CM: R06.02 
ICD-9-CM: 786.05 Possible CHF, HCVD, angina equivalent. Chest pain, unspecified type     ICD-10-CM: R07.9 ICD-9-CM: 786.50 Will anginal pain. Less likely GERD chest wall Type 2 diabetes mellitus without complication, without long-term current use of insulin (HCC)     ICD-10-CM: E11.9 ICD-9-CM: 250.00 On metformin. Lab with PCP Hyperlipidemia, unspecified hyperlipidemia type     ICD-10-CM: E78.5 ICD-9-CM: 272.4 On statin. Lab with PCP Abnormal EKG     ICD-10-CM: R94.31 
ICD-9-CM: 794.31 Nonspecific changes. Possible HCVD, ischemia Elevated brain natriuretic peptide (BNP) level     ICD-10-CM: R79.89 ICD-9-CM: 790.99 2 separate occasions recently. Possible cardiomyopathy possible CHF [facility]     ICD-10-CM: I42.9 ICD-9-CM: 425.4 EF reported 45-50% in 2010 Vitals BP Pulse Height(growth percentile) Weight(growth percentile) BMI Smoking Status 95/64 97 5' 9\" (1.753 m) 211 lb (95.7 kg) 31.16 kg/m2 Former Smoker Vitals History BMI and BSA Data Body Mass Index Body Surface Area  
 31.16 kg/m 2 2.16 m 2 Preferred Pharmacy Pharmacy Name Phone [id number]- [facility] [address] Your Updated Medication List  
  
   
This list is accurate as of 7/13/18 12:09 PM.  Always use your most recent med list.  
  
  
  
  
 aspirin 81 mg chewable tablet Take 1 Tab by mouth daily. LASIX 20 mg tablet Generic drug:  furosemide Take  by mouth daily. metFORMIN 1,000 mg tablet Commonly known as:  GLUCOPHAGE Take 1,000 mg by mouth two (2) times daily (with meals). simvastatin 20 mg tablet Commonly known as:  ZOCOR Take  by mouth nightly. Follow-up Instructions Return for F/u after tests. To-Do List   
 07/13/2018 Echocardiography:  ECHO ADULT COMPLETE   
  
 07/13/2018 NM Stress:  NUCLEAR CARDIAC STRESS TEST Introducing [facility]!    
 [facility] introduces [employer] patient portal. Now you can access parts of your medical record, email your doctor's office, and request medication refills online. 1. In your internet browser, go to [email or web address] 2. Click on the First Time User? Click Here link in the Sign In box. You will see the New Member Sign Up page. 3. Enter your [employer] Access Code exactly as it appears below. You will not need to use this code after youve completed the sign-up process. If you do not sign up before the expiration date, you must request a new code. · [employer] Access Code: [email or web address] Expires: 8/19/2018 10:13 AM 
 
4. Enter the last four digits of your Social Security Number (xxxx) and Date of Birth (mm/dd/yyyy) as indicated and click Submit. You will be taken to the next sign-up page. 5. Create a [employer] ID. This will be your [employer] login ID and cannot be changed, so think of one that is secure and easy to remember. 6. Create a [employer] password. You can change your password at any time. 7. Enter your Password Reset Question and Answer. This can be used at a later time if you forget your password. 8. Enter your e-mail address. You will receive e-mail notification when new information is available in [address] 9. Click Sign Up. You can now view and download portions of your medical record. 10. Click the Download Summary menu link to download a portable copy of your medical information. If you have questions, please visit the Frequently Asked Questions section of the [employer] website. Remember, [employer] is NOT to be used for urgent needs. For medical emergencies, dial 911. Now available from your iPhone and Android! Please provide this summary of care documentation to your next provider. Your primary care clinician is listed as [place]. If you have any questions after today's visit, please call [phone].

## 2018-08-03 ENCOUNTER — APPOINTMENT (OUTPATIENT)
Dept: CT IMAGING | Age: 63
End: 2018-08-03
Attending: EMERGENCY MEDICINE
Payer: COMMERCIAL

## 2018-08-03 ENCOUNTER — APPOINTMENT (OUTPATIENT)
Dept: GENERAL RADIOLOGY | Age: 63
End: 2018-08-03
Attending: EMERGENCY MEDICINE
Payer: COMMERCIAL

## 2018-08-03 ENCOUNTER — HOSPITAL ENCOUNTER (EMERGENCY)
Age: 63
Discharge: HOME | End: 2018-08-03
Attending: EMERGENCY MEDICINE | Admitting: EMERGENCY MEDICINE
Payer: COMMERCIAL

## 2018-08-03 VITALS
TEMPERATURE: 98.4 F | HEART RATE: 97 BPM | RESPIRATION RATE: 16 BRPM | SYSTOLIC BLOOD PRESSURE: 107 MMHG | DIASTOLIC BLOOD PRESSURE: 77 MMHG | OXYGEN SATURATION: 100 %

## 2018-08-03 DIAGNOSIS — I50.23 ACUTE ON CHRONIC SYSTOLIC HEART FAILURE (HCC): Primary | ICD-10-CM

## 2018-08-03 PROBLEM — R79.89 POSITIVE D DIMER: Status: ACTIVE | Noted: 2018-08-03

## 2018-08-03 LAB
ALBUMIN SERPL-MCNC: 3.4 G/DL (ref 3.4–5)
ALBUMIN/GLOB SERPL: 1 {RATIO} (ref 0.8–1.7)
ALP SERPL-CCNC: 36 U/L (ref 45–117)
ALT SERPL-CCNC: 44 U/L (ref 16–61)
ANION GAP SERPL CALC-SCNC: 8 MMOL/L (ref 3–18)
AST SERPL-CCNC: 26 U/L (ref 15–37)
BASOPHILS # BLD: 0 K/UL (ref 0–0.1)
BASOPHILS NFR BLD: 1 % (ref 0–2)
BILIRUB SERPL-MCNC: 0.4 MG/DL (ref 0.2–1)
BNP SERPL-MCNC: 5852 PG/ML (ref 0–900)
BUN SERPL-MCNC: 20 MG/DL (ref 7–18)
BUN/CREAT SERPL: 15 (ref 12–20)
CALCIUM SERPL-MCNC: 8.4 MG/DL (ref 8.5–10.1)
CHLORIDE SERPL-SCNC: 107 MMOL/L (ref 100–108)
CK MB CFR SERPL CALC: 1.3 % (ref 0–4)
CK MB SERPL-MCNC: 3.8 NG/ML (ref 5–25)
CK SERPL-CCNC: 295 U/L (ref 39–308)
CO2 SERPL-SCNC: 28 MMOL/L (ref 21–32)
CREAT SERPL-MCNC: 1.35 MG/DL (ref 0.6–1.3)
D DIMER PPP FEU-MCNC: 7.02 UG/ML(FEU)
DIFFERENTIAL METHOD BLD: ABNORMAL
EOSINOPHIL # BLD: 0.2 K/UL (ref 0–0.4)
EOSINOPHIL NFR BLD: 4 % (ref 0–5)
ERYTHROCYTE [DISTWIDTH] IN BLOOD BY AUTOMATED COUNT: 13.1 % (ref 11.6–14.5)
GLOBULIN SER CALC-MCNC: 3.5 G/DL (ref 2–4)
GLUCOSE SERPL-MCNC: 126 MG/DL (ref 74–99)
HCT VFR BLD AUTO: 36.3 % (ref 36–48)
HGB BLD-MCNC: 12.5 G/DL (ref 13–16)
LYMPHOCYTES # BLD: 2 K/UL (ref 0.9–3.6)
LYMPHOCYTES NFR BLD: 47 % (ref 21–52)
MCH RBC QN AUTO: 30.1 PG (ref 24–34)
MCHC RBC AUTO-ENTMCNC: 34.4 G/DL (ref 31–37)
MCV RBC AUTO: 87.5 FL (ref 74–97)
MONOCYTES # BLD: 0.4 K/UL (ref 0.05–1.2)
MONOCYTES NFR BLD: 10 % (ref 3–10)
NEUTS SEG # BLD: 1.6 K/UL (ref 1.8–8)
NEUTS SEG NFR BLD: 38 % (ref 40–73)
PLATELET # BLD AUTO: 181 K/UL (ref 135–420)
PMV BLD AUTO: 10.1 FL (ref 9.2–11.8)
POTASSIUM SERPL-SCNC: 4.2 MMOL/L (ref 3.5–5.5)
PROT SERPL-MCNC: 6.9 G/DL (ref 6.4–8.2)
RBC # BLD AUTO: 4.15 M/UL (ref 4.7–5.5)
SODIUM SERPL-SCNC: 143 MMOL/L (ref 136–145)
TROPONIN I SERPL-MCNC: 0.02 NG/ML (ref 0–0.04)
WBC # BLD AUTO: 4.2 K/UL (ref 4.6–13.2)

## 2018-08-03 PROCEDURE — 74011636320 HC RX REV CODE- 636/320: Performed by: EMERGENCY MEDICINE

## 2018-08-03 PROCEDURE — 93005 ELECTROCARDIOGRAM TRACING: CPT

## 2018-08-03 PROCEDURE — 74011250636 HC RX REV CODE- 250/636: Performed by: EMERGENCY MEDICINE

## 2018-08-03 PROCEDURE — 99284 EMERGENCY DEPT VISIT MOD MDM: CPT

## 2018-08-03 PROCEDURE — 82550 ASSAY OF CK (CPK): CPT | Performed by: EMERGENCY MEDICINE

## 2018-08-03 PROCEDURE — 85379 FIBRIN DEGRADATION QUANT: CPT | Performed by: EMERGENCY MEDICINE

## 2018-08-03 PROCEDURE — 80053 COMPREHEN METABOLIC PANEL: CPT | Performed by: EMERGENCY MEDICINE

## 2018-08-03 PROCEDURE — 85025 COMPLETE CBC W/AUTO DIFF WBC: CPT | Performed by: EMERGENCY MEDICINE

## 2018-08-03 PROCEDURE — 83880 ASSAY OF NATRIURETIC PEPTIDE: CPT | Performed by: EMERGENCY MEDICINE

## 2018-08-03 PROCEDURE — 71045 X-RAY EXAM CHEST 1 VIEW: CPT

## 2018-08-03 PROCEDURE — 71275 CT ANGIOGRAPHY CHEST: CPT

## 2018-08-03 PROCEDURE — 96374 THER/PROPH/DIAG INJ IV PUSH: CPT

## 2018-08-03 RX ORDER — FUROSEMIDE 40 MG/1
40 TABLET ORAL DAILY
Qty: 30 TAB | Refills: 0 | Status: SHIPPED | OUTPATIENT
Start: 2018-08-03 | End: 2018-09-02

## 2018-08-03 RX ORDER — SPIRONOLACTONE 25 MG/1
25 TABLET ORAL DAILY
Qty: 30 TAB | Refills: 0 | Status: SHIPPED | OUTPATIENT
Start: 2018-08-03 | End: 2018-09-02

## 2018-08-03 RX ORDER — CLINDAMYCIN PALMITATE HYDROCHLORIDE 75 MG/5ML
300 GRANULE, FOR SOLUTION ORAL 3 TIMES DAILY
Qty: 600 ML | Refills: 0 | Status: SHIPPED | OUTPATIENT
Start: 2018-08-03 | End: 2018-08-13

## 2018-08-03 RX ORDER — FUROSEMIDE 10 MG/ML
40 INJECTION INTRAMUSCULAR; INTRAVENOUS
Status: COMPLETED | OUTPATIENT
Start: 2018-08-03 | End: 2018-08-03

## 2018-08-03 RX ORDER — CARVEDILOL 3.12 MG/1
3.12 TABLET ORAL 2 TIMES DAILY WITH MEALS
Qty: 60 TAB | Refills: 0 | Status: SHIPPED | OUTPATIENT
Start: 2018-08-03 | End: 2018-09-02

## 2018-08-03 RX ORDER — LISINOPRIL 5 MG/1
5 TABLET ORAL DAILY
Qty: 30 TAB | Refills: 0 | Status: SHIPPED | OUTPATIENT
Start: 2018-08-03 | End: 2018-09-02

## 2018-08-03 RX ORDER — DIGOXIN 125 MCG
0.12 TABLET ORAL DAILY
Qty: 30 TAB | Refills: 0 | Status: SHIPPED | OUTPATIENT
Start: 2018-08-03 | End: 2018-09-02

## 2018-08-03 RX ADMIN — FUROSEMIDE 40 MG: 10 INJECTION, SOLUTION INTRAMUSCULAR; INTRAVENOUS at 12:50

## 2018-08-03 RX ADMIN — IOPAMIDOL 100 ML: 755 INJECTION, SOLUTION INTRAVENOUS at 15:18

## 2018-08-03 NOTE — DISCHARGE INSTRUCTIONS
Heart Failure: Care Instructions  Your Care Instructions    Heart failure occurs when your heart does not pump as much blood as the body needs. Failure does not mean that the heart has stopped pumping but rather that it is not pumping as well as it should. Over time, this causes fluid buildup in your lungs and other parts of your body. Fluid buildup can cause shortness of breath, fatigue, swollen ankles, and other problems. By taking medicines regularly, reducing sodium (salt) in your diet, checking your weight every day, and making lifestyle changes, you can feel better and live longer. Follow-up care is a key part of your treatment and safety. Be sure to make and go to all appointments, and call your doctor if you are having problems. It's also a good idea to know your test results and keep a list of the medicines you take. How can you care for yourself at home? Medicines    · Be safe with medicines. Take your medicines exactly as prescribed. Call your doctor if you think you are having a problem with your medicine.     · Do not take any vitamins, over-the-counter medicine, or herbal products without talking to your doctor first. Dariusz Rist not take ibuprofen (Advil or Motrin) and naproxen (Aleve) without talking to your doctor first. They could make your heart failure worse.     · You may be taking some of the following medicine. ¨ Beta-blockers can slow heart rate, decrease blood pressure, and improve your condition. Taking a beta-blocker may lower your chance of needing to be hospitalized. ¨ Angiotensin-converting enzyme inhibitors (ACEIs) reduce the heart's workload, lower blood pressure, and reduce swelling. Taking an ACEI may lower your chance of needing to be hospitalized again. ¨ Angiotensin II receptor blockers (ARBs) work like ACEIs. Your doctor may prescribe them instead of ACEIs. ¨ Diuretics, also called water pills, reduce swelling.   ¨ Potassium supplements replace this important mineral, which is sometimes lost with diuretics. ¨ Aspirin and other blood thinners prevent blood clots, which can cause a stroke or heart attack.    You will get more details on the specific medicines your doctor prescribes. Diet    · Your doctor may suggest that you limit sodium to 2,000 milligrams (mg) a day or less. That is less than 1 teaspoon of salt a day, including all the salt you eat in cooking or in packaged foods. People get most of their sodium from processed foods. Fast food and restaurant meals also tend to be very high in sodium.     · Ask your doctor how much liquid you can drink each day. You may have to limit liquids.    Weight    · Weigh yourself without clothing at the same time each day. Record your weight. Call your doctor if you have a sudden weight gain, such as more than 2 to 3 pounds in a day or 5 pounds in a week. (Your doctor may suggest a different range of weight gain.) A sudden weight gain may mean that your heart failure is getting worse.    Activity level    · Start light exercise (if your doctor says it is okay). Even if you can only do a small amount, exercise will help you get stronger, have more energy, and manage your weight and your stress. Walking is an easy way to get exercise. Start out by walking a little more than you did before. Bit by bit, increase the amount you walk.     · When you exercise, watch for signs that your heart is working too hard. You are pushing yourself too hard if you cannot talk while you are exercising. If you become short of breath or dizzy or have chest pain, stop, sit down, and rest.     · If you feel \"wiped out\" the day after you exercise, walk slower or for a shorter distance until you can work up to a better pace.     · Get enough rest at night. Sleeping with 1 or 2 pillows under your upper body and head may help you breathe easier.    Lifestyle changes    · Do not smoke. Smoking can make a heart condition worse.  If you need help quitting, talk to your doctor about stop-smoking programs and medicines. These can increase your chances of quitting for good. Quitting smoking may be the most important step you can take to protect your heart.     · Limit alcohol to 2 drinks a day for men and 1 drink a day for women. Too much alcohol can cause health problems.     · Avoid getting sick from colds and the flu. Get a pneumococcal vaccine shot. If you have had one before, ask your doctor whether you need another dose. Get a flu shot each year. If you must be around people with colds or the flu, wash your hands often. When should you call for help? Call 911 if you have symptoms of sudden heart failure such as:    · You have severe trouble breathing.     · You cough up pink, foamy mucus.     · You have a new irregular or rapid heartbeat.    Call your doctor now or seek immediate medical care if:    · You have new or increased shortness of breath.     · You are dizzy or lightheaded, or you feel like you may faint.     · You have sudden weight gain, such as more than 2 to 3 pounds in a day or 5 pounds in a week. (Your doctor may suggest a different range of weight gain.)     · You have increased swelling in your legs, ankles, or feet.     · You are suddenly so tired or weak that you cannot do your usual activities.    Watch closely for changes in your health, and be sure to contact your doctor if you develop new symptoms. Where can you learn more? Go to http://brandt-lore.info/. Enter F493 in the search box to learn more about \"Heart Failure: Care Instructions. \"  Current as of: May 10, 2017  Content Version: 11.7  © 7187-3136 [employer]. Care instructions adapted under license by [employer] (which disclaims liability or warranty for this information).  If you have questions about a medical condition or this instruction, always ask your healthcare professional. [address] any warranty or liability for your use of this information.

## 2018-08-03 NOTE — CONSULTS
[facility] - Consult Note    Date of  Admission: 8/3/2018 11:48 AM     Primary Care Physician:  Eleni Everett MD     Plan:     1. Acute on chronic systolic CHF - Echo with EF 15%, decreased from 45-50 % prior. NT pro BNP 5852. Lasix 20 mg IV BID, Begin low dose coreg, ace-I, digoxin and aldactone. 2. Elevated D - Dimer - Echo consistent with Ramos's sign. Recommend CTA to r/o PE - and consider anticoagulation. 3. Cardiomyopathy - EF 15%, Will need ischemic evaluation at some point. 4. DM 2 - treatment per primary team.  5. Hyperlipidemia - continue statin, check lipid panel. Awaiting CTA chest - if negative-- patient wants to home and follow up as outpatient. If discharged, will need early clinic appt  Will need stress test vs [facility] as outpatient once CHF improves. Assessment:     Hospital Problems  Date Reviewed: 8/3/2018          Codes Class Noted POA    Positive D dimer ICD-10-CM: R79.89  ICD-9-CM: 790.92  8/3/2018 Unknown        SOB (shortness of breath) ICD-10-CM: R06.02  ICD-9-CM: 786.05  7/13/2018 Yes    Overview Signed 7/13/2018 11:55 AM by Dorothy Roberson MD     Possible CHF, HCVD, angina equivalent. Type 2 diabetes mellitus without complication, without long-term current use of insulin [facility] ICD-10-CM: E11.9  ICD-9-CM: 250.00  7/13/2018 Yes    Overview Signed 7/13/2018 11:55 AM by Dorothy Roberson MD     On metformin. Lab with PCP             Hyperlipidemia ICD-10-CM: E78.5  ICD-9-CM: 272.4  7/13/2018 Yes    Overview Signed 7/13/2018 11:55 AM by Dorothy Roberson MD     On statin. Lab with PCP             Cardiomyopathy [facility]) ICD-10-CM: I42.9  ICD-9-CM: 425.4  7/13/2018 Yes    Overview Signed 7/13/2018 12:05 PM by Dorothy Roberson MD     EF reported 45-50% in 2010                    History of Present Illness: This patient has been seen and evaluated at the request of  for acute systolic CHF.  Mr. Wendy Douglas has PMH of cardiomyopathy, DM2 and hyperlipidemia. Mr. Caroline Felix was referred from our office today with c/o worsening SOB. Echo in office today revealed EF 15% decreased form 45-50% prior. He c/o increased BLE edema, with abdominal distension and orthopnea. He denies chest pain, palpitations, dizziness. He is taking all medications consistently with exception of albuterol inhaler - which he recently ran out of. He denies alcohol or tobacco use. Past Medical History:     Past Medical History:   Diagnosis Date    Arthritis     Cardiomyopathy ([facility] 7/13/2018    EF reported 45-50% in 2010    Chest pain 7/13/2018    Will anginal pain. Less likely GERD chest wall    Hyperlipidemia 7/13/2018    On statin. Lab with PCP    Nonspecific abnormal electrocardiogram (ECG) (EKG)     Obesity, unspecified     Other and unspecified hyperlipidemia     Other primary cardiomyopathies     Shortness of breath     SOB (shortness of breath) 7/13/2018    Possible CHF, HCVD, angina equivalent.  Type 2 diabetes mellitus without complication, without long-term current use of insulin ([facility] 7/13/2018    On metformin.   Lab with PCP    Type II or unspecified type diabetes mellitus without mention of complication, not stated as uncontrolled          Social History:     Social History     Social History    Marital status: SINGLE     Spouse name: N/A    Number of children: N/A    Years of education: N/A     Social History Main Topics    Smoking status: Former Smoker     Quit date: 12/6/1998    Smokeless tobacco: Never Used    Alcohol use No      Comment: former heavy drinker    Drug use: No    Sexual activity: Not on file     Other Topics Concern    Not on file     Social History Narrative        Family History:     Family History   Problem Relation Age of Onset    Heart Disease Mother     No Known Problems Father     Heart Disease Brother      enlarged heart        Medications:   No Known Allergies     No current facility-administered medications for this encounter. Current Outpatient Prescriptions   Medication Sig    furosemide (LASIX) 20 mg tablet Take  by mouth daily.  aspirin 81 mg chewable tablet Take 1 Tab by mouth daily.  metFORMIN (GLUCOPHAGE) 1,000 mg tablet Take 1,000 mg by mouth two (2) times daily (with meals).  simvastatin (ZOCOR) 20 mg tablet Take  by mouth nightly. Review Of Systems:     Constitutional: No fever, no chills, no weight loss, no night sweats   HEENT: No epistaxis, no nasal drainage, no difficulty in swallowing, no redness in eyes  Respiratory: + SOB, coug, sputum, negative for hemoptysis, pleurisy/chest pain,  Cardiovascular: + BLE edema, no chest pain, no chest pressure, no dyspnea, no pnd, no claudication no palpitations, no syncope  Gastrointestinal: + abdominal distension; No abd pain, no vomiting, no diarrhea, no bleeding symptoms  Genitourinary: No urinary symptoms or hematuria  Integument/breast: No ulcers or rashes  Musculoskeletal: no muscle pain, no weakness  Neurological: No focal weakness, no seizures, no headaches  Behvioral/Psych: No anxiety, no depression     Physical Exam:     Visit Vitals    /84    Pulse 98    Temp 98.4 °F (36.9 °C)    Resp 18    SpO2 99%     BP Readings from Last 3 Encounters:   08/03/18 114/84   08/03/18 114/70   07/13/18 95/64     Pulse Readings from Last 3 Encounters:   08/03/18 98   07/13/18 97   06/22/18 92     Wt Readings from Last 3 Encounters:   08/03/18 95.7 kg (211 lb)   07/13/18 95.7 kg (211 lb)   06/22/18 95.3 kg (210 lb)       General:  alert, cooperative, no distress, appears stated age  Skin: Warm and dry, acyanotic, normal color. Head: Normocephalic, atraumatic. Eyes: Sclerae anicteric, conjunctivae without injection.  Left eye blindness  Neck:  nontender, no nuchal rigidity, no masses, no stridor, no carotid bruit, no JVD  Lungs: + bibasilar rales, no rhonchi , wheezes   Heart:  regular rate and rhythm, S1, S2 normal, no murmur, click, rub or gallop  Abdomen:  abdomen is soft without significant tenderness, masses, organomegaly or guarding  Extremities:  extremities normal, atraumatic, no cyanosis or 1+ BLE edema  Neurological: grossly intact. No focal abnormalities, moves all extremities well. Psychiatric Affect: The patient is awake, alert and oriented x3. [place] is interactive and appropriate.    Data Review:     Recent Results (from the past 50 hour(s))   ECHO ADULT COMPLETE    Collection Time: 08/03/18 11:28 AM   Result Value Ref Range    LA Volume 82.5 (A) 18 - 58 mL    Ao Root D 3.40 cm    LA Vol Index 39.04 ml/m2    AO ASC D 3.30 cm    Aortic Valve Systolic Peak Velocity 35.80 cm/s    AoV PG 3.3 mmHg    LVIDd 6.05 (A) 4.2 - 5.9 cm    LVPWd 0.96 0.6 - 1.0 cm    LVIDs 5.67 cm    IVSd 1.41 (A) 0.6 - 1.0 cm    LVOT Peak Velocity 60.39 cm/s    LVOT Peak Gradient 1.5 mmHg    LV E' Lateral Velocity 7.21 cm/s    MVA (PHT) 7.6 cm2    MV A Randolph 26.69 cm/s    MV E Randolph 1.00 cm/s    MV E/A 0.04     RVIDd 6.15 cm    Inferior Vena Cava Diameter Sniffing 2.05 cm    LA Vol 4C 76.44 (A) 18 - 58 mL    LA Vol 2C 90.59 (A) 18 - 58 mL    LA Area 4C 24.0 cm2    LV Mass .6 (A) 88 - 224 g    LV Mass AL Index 178.2 (A) g/m2    E/E' lateral 0.14     Right Atrial Area 4C 25.8 cm2    RVSP 53.4 mmHg    Mitral Regurgitant Velocity Time Integral 100.66 cm    Est. RA Pressure 15.0 mmHg    Mitral Regurgitant Peak Velocity 348.07 cm/s    Mitral Valve E Wave Deceleration Time 100.3 ms    Mitral Valve Pressure Half-time 29.1 ms    Left Atrium Major Axis 3.89 cm    Triscuspid Valve Regurgitation Peak Gradient 38.4 mmHg    Pulmonic Valve Max Velocity 76.52 cm/s    TR Max Velocity 309.79 cm/s    PASP 53.4 mmHg    LA Vol Index 42.87 ml/m2    LA Vol Index 36.17 ml/m2    MR Peak Gradient 48.5 mmHg    PV peak gradient 2.3 mmHg   CBC WITH AUTOMATED DIFF    Collection Time: 08/03/18 12:20 PM   Result Value Ref Range    WBC 4.2 (L) 4.6 - 13.2 K/uL    RBC 4.15 (L) 4.70 - 5.50 M/uL HGB 12.5 (L) 13.0 - 16.0 g/dL    HCT 36.3 36.0 - 48.0 %    MCV 87.5 74.0 - 97.0 FL    MCH 30.1 24.0 - 34.0 PG    MCHC 34.4 31.0 - 37.0 g/dL    RDW 13.1 11.6 - 14.5 %    PLATELET 027 521 - 143 K/uL    MPV 10.1 9.2 - 11.8 FL    NEUTROPHILS 38 (L) 40 - 73 %    LYMPHOCYTES 47 21 - 52 %    MONOCYTES 10 3 - 10 %    EOSINOPHILS 4 0 - 5 %    BASOPHILS 1 0 - 2 %    ABS. NEUTROPHILS 1.6 (L) 1.8 - 8.0 K/UL    ABS. LYMPHOCYTES 2.0 0.9 - 3.6 K/UL    ABS. MONOCYTES 0.4 0.05 - 1.2 K/UL    ABS. EOSINOPHILS 0.2 0.0 - 0.4 K/UL    ABS. BASOPHILS 0.0 0.0 - 0.1 K/UL    DF AUTOMATED     METABOLIC PANEL, COMPREHENSIVE    Collection Time: 08/03/18 12:20 PM   Result Value Ref Range    Sodium 143 136 - 145 mmol/L    Potassium 4.2 3.5 - 5.5 mmol/L    Chloride 107 100 - 108 mmol/L    CO2 28 21 - 32 mmol/L    Anion gap 8 3.0 - 18 mmol/L    Glucose 126 (H) 74 - 99 mg/dL    BUN 20 (H) 7.0 - 18 MG/DL    Creatinine 1.35 (H) 0.6 - 1.3 MG/DL    BUN/Creatinine ratio 15 12 - 20      GFR est AA >60 >60 ml/min/1.73m2    GFR est non-AA 53 (L) >60 ml/min/1.73m2    Calcium 8.4 (L) 8.5 - 10.1 MG/DL    Bilirubin, total 0.4 0.2 - 1.0 MG/DL    ALT (SGPT) 44 16 - 61 U/L    AST (SGOT) 26 15 - 37 U/L    Alk.  phosphatase 36 (L) 45 - 117 U/L    Protein, total 6.9 6.4 - 8.2 g/dL    Albumin 3.4 3.4 - 5.0 g/dL    Globulin 3.5 2.0 - 4.0 g/dL    A-G Ratio 1.0 0.8 - 1.7     D DIMER    Collection Time: 08/03/18 12:20 PM   Result Value Ref Range    D DIMER 7.02 (H) <0.46 ug/ml(FEU)   CARDIAC PANEL,(CK, CKMB & TROPONIN)    Collection Time: 08/03/18 12:20 PM   Result Value Ref Range     39 - 308 U/L    CK - MB 3.8 (H) <3.6 ng/ml    CK-MB Index 1.3 0.0 - 4.0 %    Troponin-I, Qt. 0.02 0.0 - 0.045 NG/ML   NT-PRO BNP    Collection Time: 08/03/18 12:20 PM   Result Value Ref Range    NT pro-BNP 5852 (H) 0 - 900 PG/ML   EKG, 12 LEAD, INITIAL    Collection Time: 08/03/18 12:37 PM   Result Value Ref Range    Ventricular Rate 97 BPM    Atrial Rate 97 BPM    P-R Interval 170 ms QRS Duration 102 ms    Q-T Interval 388 ms    QTC Calculation (Bezet) 492 ms    Calculated P Axis 63 degrees    Calculated R Axis -58 degrees    Calculated T Axis 105 degrees    Diagnosis       Normal sinus rhythm  Possible Left atrial enlargement  Left axis deviation  Nonspecific T wave abnormality  Prolonged QT  Abnormal ECG  When compared with ECG of 22-JUN-2018 10:24,  QRS axis shifted left           Intake/Output Summary (Last 24 hours) at 08/03/18 1430  Last data filed at 08/03/18 1338   Gross per 24 hour   Intake                0 ml   Output              700 ml   Net             -700 ml       Cardiographics:     ECG: normal EKG, sinus tach    Echocardiogram:8/3/2018  · Left ventricular severely decreased systolic function. Calculated left ventricular ejection fraction is 15%. Left ventricular cavity size is severely dilated. Left ventricular global hypokinesis. Severe (grade 3) left ventricular diastolic dysfunction. · Right ventricular cavity size is severely dilated. Right ventricular global systolic function is severely reduced. Right ventricular findings are consistent with Ramos's sign. · Right atrial cavity size is severely dilated. · Mild aortic valve sclerosis. · Mild mitral valve regurgitation. · Mild tricuspid valve regurgitation is present. Pulmonary arterial systolic pressure is 53 mmHg. Moderate pulmonary hypertension is present. · Severely elevated central venous pressure (15+ mmHg); IVC diameter is larger than 21 mm and collapses less than 50% with respiration. · Mild pulmonic valve regurgitation is present. Signed By: Sawyer Scruggs NP supervised    August 3, 2018      I have independently evaluated and examined the patient. All relevant labs and testing data's are reviewed. Care plan discussed and updated after review.     Charlotte Clemens MD

## 2018-08-03 NOTE — ED NOTES
Patent alert and oriented, no distress. Prescriptions gone over with patient as well as follow up information. Patient ambulatory with steady gait at departure.

## 2018-08-03 NOTE — ED PROVIDER NOTES
EMERGENCY DEPARTMENT HISTORY AND PHYSICAL EXAM 
 
12:35 PM 
 
 
Date: 8/3/2018 Patient Name: Alfa Roy History of Presenting Illness Chief Complaint Patient presents with  Shortness of Breath  Leg Swelling History Provided By: Patient Chief Complaint: SON and leg swelling Duration: 1 Weeks Timing:  Gradual and Worsening Location: LE 
Quality: n/a Severity: N/A Modifying Factors: exertion and laying flat exacerbates sx Associated Symptoms: denies any other associated signs or symptoms Additional History (Context): Alfa Roy is a 60 y.o. male with PMHx significant for DM2, HLD, and cardiomyopathy who presents via self with CC of  SOB with bilateral leg swelling since Saturday that has been gradually getting worse. Pt was seen Dr. Delgado Solomon today for his first visit was told to report to the ED for new onset of heart failure. Pt says his sx has been exacerbated on exertion and with laying flat; has to sleep on multiple pillows at night. Reports some intermittent swelling in abdomen. Pt is on diuretics. Has been taking daily medications as directed. No hx of MI. Says sometimes his inhaler helps with SOB but he has been out. Denies CP, fever, chills, nausea, vomiting, back pain, neck pain, weakness, numbness, or any other associated sx. No other associated sx. PCP: Ru La MD 
 
Current Outpatient Prescriptions Medication Sig Dispense Refill  clindamycin (CLEOCIN) 75 mg/5 mL solution Take 20 mL by mouth three (3) times daily for 10 days. Indications: pharyngeal abscess/draining 600 mL 0  
 dexamethasone (DECADRON) 1 mg/mL solution Take 10 mL by mouth daily for 10 days. Indications: head and neck mass 100 mL 0  
 carvedilol (COREG) 3.125 mg tablet Take 1 Tab by mouth two (2) times daily (with meals) for 30 days. 60 Tab 0  
 digoxin (LANOXIN) 0.125 mg tablet Take 1 Tab by mouth daily for 30 days.  30 Tab 0  
 lisinopril (PRINIVIL, ZESTRIL) 5 mg tablet Take 1 Tab by mouth daily for 30 days. 30 Tab 0  
 furosemide (LASIX) 40 mg tablet Take 1 Tab by mouth daily for 30 days. 30 Tab 0  
 spironolactone (ALDACTONE) 25 mg tablet Take 1 Tab by mouth daily for 30 days. 30 Tab 0  
 furosemide (LASIX) 20 mg tablet Take  by mouth daily.  aspirin 81 mg chewable tablet Take 1 Tab by mouth daily. 30 Tab 0  
 metFORMIN (GLUCOPHAGE) 1,000 mg tablet Take 1,000 mg by mouth two (2) times daily (with meals).  simvastatin (ZOCOR) 20 mg tablet Take  by mouth nightly. Past History Past Medical History: 
Past Medical History:  
Diagnosis Date  Arthritis  Cardiomyopathy ([facility] [address] 7/13/2018 EF reported 45-50% in 2010  Chest pain 7/13/2018 Will anginal pain. Less likely GERD chest wall  Hyperlipidemia 7/13/2018 On statin. Lab with PCP  Nonspecific abnormal electrocardiogram (ECG) (EKG)  Obesity, unspecified  Other and unspecified hyperlipidemia  Other primary cardiomyopathies  Shortness of breath  SOB (shortness of breath) 7/13/2018 Possible CHF, HCVD, angina equivalent.  Type 2 diabetes mellitus without complication, without long-term current use of insulin ([facility] [address] 7/13/2018 On metformin. Lab with PCP  Type II or unspecified type diabetes mellitus without mention of complication, not stated as uncontrolled Past Surgical History: 
History reviewed. No pertinent surgical history. Family History: 
Family History Problem Relation Age of Onset  Heart Disease Mother  No Known Problems Father  Heart Disease Brother   
  enlarged heart Social History: 
Social History Substance Use Topics  Smoking status: Former Smoker Quit date: 12/6/1998  Smokeless tobacco: Never Used  Alcohol use No  
   Comment: former heavy drinker Allergies: 
No Known Allergies Review of Systems Review of Systems Constitutional: Negative for chills, fatigue and fever.   
HENT: Negative. Negative for sore throat. Eyes: Negative. Respiratory: Positive for shortness of breath. Negative for cough. Cardiovascular: Positive for leg swelling. Negative for chest pain and palpitations. Gastrointestinal: Negative for abdominal pain, nausea and vomiting. Genitourinary: Negative for dysuria. Musculoskeletal: Negative. Skin: Negative. Neurological: Negative for dizziness, weakness, light-headedness and headaches. Psychiatric/Behavioral: Negative. All other systems reviewed and are negative. Physical Exam  
 
Visit Vitals  /77  Pulse 97  Temp 98.4 °F (36.9 °C)  Resp 16  SpO2 100% Physical Exam  
Constitutional: He is oriented to person, place, and time. He appears well-developed and well-nourished. HENT:  
Head: Normocephalic and atraumatic. Mouth/Throat: Oropharynx is clear and moist.  
Eyes: Conjunctivae are normal. Pupils are equal, round, and reactive to light. No scleral icterus. Chronic dense cataract on the left eye Neck: Normal range of motion. Neck supple. JVD present. No tracheal deviation present. JVD up to angle of mandible. Cardiovascular: Normal rate, regular rhythm and normal heart sounds. No murmur heard. Pulmonary/Chest: Effort normal and breath sounds normal. No respiratory distress. He has no wheezes. Crackle in bases. Abdominal: Soft. Bowel sounds are normal.  
Musculoskeletal: Normal range of motion. +2 plus pitting edema up to edema. Neurological: He is alert and oriented to person, place, and time. He has normal strength. Gait normal. GCS eye subscore is 4. GCS verbal subscore is 5. GCS motor subscore is 6. Skin: Skin is warm and dry. Psychiatric: He has a normal mood and affect. Nursing note and vitals reviewed. Diagnostic Study Results Labs - Recent Results (from the past 12 hour(s)) ECHO ADULT COMPLETE Collection Time: 08/03/18 11:28 AM  
Result Value Ref Range  LA Volume 82.5 (A) 18 - 58 mL Ao Root D 3.40 cm LA Vol Index 39.04 ml/m2 AO ASC D 3.30 cm Aortic Valve Systolic Peak Velocity 91.66 cm/s AoV PG 3.3 mmHg LVIDd 6.05 (A) 4.2 - 5.9 cm  
 LVPWd 0.96 0.6 - 1.0 cm LVIDs 5.67 cm IVSd 1.41 (A) 0.6 - 1.0 cm  
 LVOT Peak Velocity 60.39 cm/s LVOT Peak Gradient 1.5 mmHg LV E' Lateral Velocity 7.21 cm/s  
 MVA (PHT) 7.6 cm2  
 MV A Randolph 26.69 cm/s  
 MV E Randolph 1.00 cm/s  
 MV E/A 0.04   
 RVIDd 6.15 cm Inferior Vena Cava Diameter Sniffing 2.05 cm LA Vol 4C 76.44 (A) 18 - 58 mL  
 LA Vol 2C 90.59 (A) 18 - 58 mL  
 LA Area 4C 24.0 cm2 LV Mass .6 (A) 88 - 224 g LV Mass AL Index 178.2 (A) g/m2 E/E' lateral 0.14 Right Atrial Area 4C 25.8 cm2 RVSP 53.4 mmHg Mitral Regurgitant Velocity Time Integral 100.66 cm Est. RA Pressure 15.0 mmHg Mitral Regurgitant Peak Velocity 348.07 cm/s Mitral Valve E Wave Deceleration Time 100.3 ms  
 Mitral Valve Pressure Half-time 29.1 ms Left Atrium Major Axis 3.89 cm Triscuspid Valve Regurgitation Peak Gradient 38.4 mmHg Pulmonic Valve Max Velocity 76.52 cm/s  
 TR Max Velocity 309.79 cm/s PASP 53.4 mmHg LA Vol Index 42.87 ml/m2 LA Vol Index 36.17 ml/m2 MR Peak Gradient 48.5 mmHg PV peak gradient 2.3 mmHg CBC WITH AUTOMATED DIFF Collection Time: 08/03/18 12:20 PM  
Result Value Ref Range WBC 4.2 (L) 4.6 - 13.2 K/uL  
 RBC 4.15 (L) 4.70 - 5.50 M/uL  
 HGB 12.5 (L) 13.0 - 16.0 g/dL HCT 36.3 36.0 - 48.0 % MCV 87.5 74.0 - 97.0 FL  
 MCH 30.1 24.0 - 34.0 PG  
 MCHC 34.4 31.0 - 37.0 g/dL  
 RDW 13.1 11.6 - 14.5 % PLATELET 693 896 - 599 K/uL MPV 10.1 9.2 - 11.8 FL  
 NEUTROPHILS 38 (L) 40 - 73 % LYMPHOCYTES 47 21 - 52 % MONOCYTES 10 3 - 10 % EOSINOPHILS 4 0 - 5 % BASOPHILS 1 0 - 2 %  
 ABS. NEUTROPHILS 1.6 (L) 1.8 - 8.0 K/UL  
 ABS. LYMPHOCYTES 2.0 0.9 - 3.6 K/UL  
 ABS. MONOCYTES 0.4 0.05 - 1.2 K/UL  
 ABS. EOSINOPHILS 0.2 0.0 - 0.4 K/UL ABS. BASOPHILS 0.0 0.0 - 0.1 K/UL  
 DF AUTOMATED METABOLIC PANEL, COMPREHENSIVE Collection Time: 08/03/18 12:20 PM  
Result Value Ref Range Sodium 143 136 - 145 mmol/L Potassium 4.2 3.5 - 5.5 mmol/L Chloride 107 100 - 108 mmol/L  
 CO2 28 21 - 32 mmol/L Anion gap 8 3.0 - 18 mmol/L Glucose 126 (H) 74 - 99 mg/dL BUN 20 (H) 7.0 - 18 MG/DL Creatinine 1.35 (H) 0.6 - 1.3 MG/DL  
 BUN/Creatinine ratio 15 12 - 20 GFR est AA >60 >60 ml/min/1.73m2 GFR est non-AA 53 (L) >60 ml/min/1.73m2 Calcium 8.4 (L) 8.5 - 10.1 MG/DL Bilirubin, total 0.4 0.2 - 1.0 MG/DL  
 ALT (SGPT) 44 16 - 61 U/L  
 AST (SGOT) 26 15 - 37 U/L Alk. phosphatase 36 (L) 45 - 117 U/L Protein, total 6.9 6.4 - 8.2 g/dL Albumin 3.4 3.4 - 5.0 g/dL Globulin 3.5 2.0 - 4.0 g/dL A-G Ratio 1.0 0.8 - 1.7 D DIMER Collection Time: 08/03/18 12:20 PM  
Result Value Ref Range D DIMER 7.02 (H) <0.46 ug/ml(FEU) CARDIAC PANEL,(CK, CKMB & TROPONIN) Collection Time: 08/03/18 12:20 PM  
Result Value Ref Range  39 - 308 U/L  
 CK - MB 3.8 (H) <3.6 ng/ml CK-MB Index 1.3 0.0 - 4.0 % Troponin-I, Qt. 0.02 0.0 - 0.045 NG/ML  
NT-PRO BNP Collection Time: 08/03/18 12:20 PM  
Result Value Ref Range NT pro-BNP 5852 (H) 0 - 900 PG/ML  
EKG, 12 LEAD, INITIAL Collection Time: 08/03/18 12:37 PM  
Result Value Ref Range Ventricular Rate 97 BPM  
 Atrial Rate 97 BPM  
 P-R Interval 170 ms QRS Duration 102 ms Q-T Interval 388 ms QTC Calculation (Bezet) 492 ms Calculated P Axis 63 degrees Calculated R Axis -58 degrees Calculated T Axis 105 degrees Diagnosis Normal sinus rhythm Possible Left atrial enlargement Left axis deviation Nonspecific T wave abnormality Prolonged QT Abnormal ECG When compared with ECG of 22-JUN-2018 10:24, 
QRS axis shifted left Radiologic Studies - Cta Chest W Or W Wo Cont Result Date: 8/3/2018 CT chest with contrast for PE CPT CODE: 68553 HISTORY: Dyspnea. COMPARISON: None. TECHNIQUE: Dynamic spiral scan through the chest is obtained from the thoracic inlet to the diaphragm after dynamic nonionic IV contrast administration  per PE protocol. Coronal and sagittal MIP computer reconstructions are also obtained for better visualization of the integrity of pulmonary arteries in 3D dimension, particularly for lobar/interlobar arterial branches and to minimize radiation dose. All CT scans at this facility performed using dose optimization techniques as appreciated to a performed exam, to include automated exposure control, adjustment of the mA and or KU according to patient size (including appropriate matching for site specific examination), or use of iterative reconstruction technique. CONTRAST: 100 cc Isovue 370. FINDINGS: PULMONARY ARTERIES: The contrast bolus is adequate. The right and left mainstem pulmonary arteries and their lobar and segmental branches appear patent without convincing evidence of intraluminal filling defect identified to suggest pulmonary embolism. Although, the subsegmental branches are suboptimally opacified by contrast for detail evaluation. No pulmonary arterial dilatation. AORTA AND THE GREAT VESSELS: Barely opacified by contrast but normal in caliber. LUNG PARENCHYMA: Mild compressive atelectasis at posterior right lower lobe due to pleural effusion. Mild patchy groundglass opacities noted at posterior left upper lobe and bilateral lower lobes. There is a ill-defined soft tissue opacity in left perihilar region, roughly measures 1.6 x 1.6 cm which could represent crowded vascular branches as better seen on coronal images #38 as well as sagittal images #56. Mild bronchial wall thickening also seen. IMAGED THYROID: Unremarkable. MEDIASTINUM: Mild mediastinal and hilar adenopathy present. HEART: The heart is mildly enlarged. Minimal pericardial fluid.  PLEURAL SPACES AND CHEST WALL: Small right pleural effusion present. VISUALIZED UPPER ABDOMEN: Unremarkable. OSSEOUS STRUCTURES: Unremarkable. IMPRESSION: 1. No convincing CT evidence of pulmonary embolism from the mainstem to segmental arteries. The smaller branches are suboptimally opacified by contrast for evaluation. 2.  Mild patchy groundglass opacities with peribronchial thickening in the dependent aspect of bilateral lungs. The finding could represent either mild edema, mild bronchiolitis or bronchial alveolitis. . Correlate. Clinically. 3. Moderate mediastinal and hilar adenopathy. 4. Small right pleural effusion. 5. Mild cardiomegaly and minimal pericardial fluid. Thank you for your referral.  
 
Xr Chest [facility] Result Date: 8/3/2018 EXAM: CHEST ONE VIEW portable 1204 hours CLINICAL HISTORY/INDICATION: Increasing shortness of breath with bilateral lower extremity edema, congestive heart failure COMPARISON: Chest x-ray June 22, 2018. TECHNIQUE: Single view obtained. FINDINGS: The cardiac silhouette is top normal in size to mildly enlarged. The lungs are clear. Pulmonary vascularity is normal. The costophrenic angles are sharply defined. No bony abnormalities are seen. IMPRESSION: Top normal cardiac size to possible minimal cardiomegaly. Medical Decision Making I am the first provider for this patient. I reviewed the vital signs, available nursing notes, past medical history, past surgical history, family history and social history. Vital Signs-Reviewed the patient's vital signs. Pulse Oximetry Analysis - 98% RA non hypoxic EKG: Interpreted by the EP. Time Interpreted: 2852 Rate: 97 Rhythm: Normal Sinus Rhythm Interpretation:T wave inversion V2-V6. Comparison: no significant change (06/22/18) Records Reviewed: Nursing Notes and Old Medical Records (Time of Review: 12:35 PM) Provider Notes (Medical Decision Making): ASSESSMENT / PLAN: 
   63y/o AAM, PMHx of HTN, DM, CHF (LVEF 45%) who presents from [facility] (Dr. Manjinder Galvez) for eval for worsening HF. Pt went there for 1st visit today. Has some slowly increasing LE edema for past few weeks and some dyspnea on exertion and orthopnea. In office, found to have normal vital, TTE showed LVEF 10% w/some R.sided dilation as well. Dr. Manjinder Galvez sent to get D-dimer and/or CTA to r/out PE and to have likely admission for workup of this new onset CHF. Pt reports no CP or classic acs symptoms and no new meds and no recent febrile illness. On exam, pleasant, sitting up in bed, NAD. Normal vitals. HEENT w/old cataract/trauma to left eye. CV w/bilat 2+ LE edema ot knees, mild cracles in bases and JVD to angle of mandible. Sounds like acute on chronic CHF. Unsrue cause. Could be ischemic but no active symtpoms now. Viral or other causes possible as well. Vitals stable now. May be able to manage as oupt. Doubt PE, but possible. -CXR 
-EKG 
-CBC, CMP, Card Enz 
-D-Dimer 
-IV access 
-Lasix 40mg IV 
-Reassess Raleigh Gómez MD 
EM-IM Physician ED Course: Progress Notes, Reevaluation, and Consults: 
4:50 PM CBC and CNP are normal. troponin negative. BNP 5800. D dimer elevated at 7. CTA chest shows no PE. Dr. Tianna Mcgregor spoke with pt and wanted to admitted him but pt wanted to try out pt treatment. Vitals stable and pt is no distress. Will D/c home with CHF medication Dr. Tianna Mcgregor would also call Tianna Mcgregor for follow up Monday. Diagnosis Clinical Impression: 1. Acute on chronic systolic heart failure ([address] Disposition: D/C home Follow-up Information Follow up With Details Comments Contact Info Claudia Moncada MD Call on 8/13/2018 Follow up. [address] [place] [address] [phone] [facility] EMERGENCY DEPT  If symptoms worsen, As needed [address] Discharge Medication List as of 8/3/2018  4:50 PM  
  
START taking these medications  Details  
clindamycin (CLEOCIN) 75 mg/5 mL solution Take 20 mL by mouth three (3) times daily for 10 days. Indications: pharyngeal abscess/draining, Print, Disp-600 mL, R-0  
  
dexamethasone (DECADRON) 1 mg/mL solution Take 10 mL by mouth daily for 10 days. Indications: head and neck mass, Print, Disp-100 mL, R-0  
  
carvedilol (COREG) 3.125 mg tablet Take 1 Tab by mouth two (2) times daily (with meals) for 30 days. , Print, Disp-60 Tab, R-0  
  
digoxin (LANOXIN) 0.125 mg tablet Take 1 Tab by mouth daily for 30 days. , Print, Disp-30 Tab, R-0  
  
lisinopril (PRINIVIL, ZESTRIL) 5 mg tablet Take 1 Tab by mouth daily for 30 days. , Print, Disp-30 Tab, R-0  
  
!! furosemide (LASIX) 40 mg tablet Take 1 Tab by mouth daily for 30 days. , Print, Disp-30 Tab, R-0  
  
spironolactone (ALDACTONE) 25 mg tablet Take 1 Tab by mouth daily for 30 days. , Print, Disp-30 Tab, R-0  
  
 !! - Potential duplicate medications found. Please discuss with provider. CONTINUE these medications which have NOT CHANGED Details  
!! furosemide (LASIX) 20 mg tablet Take  by mouth daily. , Historical Med  
  
aspirin 81 mg chewable tablet Take 1 Tab by mouth daily. , Normal, Disp-30 Tab, R-0  
  
metFORMIN (GLUCOPHAGE) 1,000 mg tablet Take 1,000 mg by mouth two (2) times daily (with meals). , Historical Med  
  
simvastatin (ZOCOR) 20 mg tablet Take  by mouth nightly., Historical Med  
  
 !! - Potential duplicate medications found. Please discuss with provider.  
  
 
_______________________________ Attestations: 
Scribe Attestation Remy Anderson acting as a scribe for and in the presence of Vibha Hartman MD     
August 03, 2018 at 12:35 PM 
    
Provider Attestation:     
I personally performed the services described in the documentation, reviewed the documentation, as recorded by the scribe in my presence, and it accurately and completely records my words and actions. August 03, 2018 at 12:35 PM - Verneda Haus, MD   
_______________________________

## 2018-08-03 NOTE — ED NOTES
Rounds made. Pt sitting up in bed awake and alert. NAD noted. VSS. Safety precautions in place. Will continue to monitor.

## 2018-08-05 LAB
ATRIAL RATE: 97 BPM
CALCULATED P AXIS, ECG09: 63 DEGREES
CALCULATED R AXIS, ECG10: -58 DEGREES
CALCULATED T AXIS, ECG11: 105 DEGREES
DIAGNOSIS, 93000: NORMAL
P-R INTERVAL, ECG05: 170 MS
Q-T INTERVAL, ECG07: 388 MS
QRS DURATION, ECG06: 102 MS
QTC CALCULATION (BEZET), ECG08: 492 MS
VENTRICULAR RATE, ECG03: 97 BPM

## 2018-08-15 ENCOUNTER — HOSPITAL ENCOUNTER (OUTPATIENT)
Dept: LAB | Age: 63
Discharge: HOME | End: 2018-08-15
Payer: COMMERCIAL

## 2018-08-15 ENCOUNTER — TELEPHONE (OUTPATIENT)
Dept: CARDIOLOGY CLINIC | Age: 63
End: 2018-08-15

## 2018-08-15 ENCOUNTER — OFFICE VISIT (OUTPATIENT)
Dept: CARDIOLOGY CLINIC | Age: 63
End: 2018-08-15

## 2018-08-15 VITALS
HEART RATE: 89 BPM | BODY MASS INDEX: 29.92 KG/M2 | DIASTOLIC BLOOD PRESSURE: 63 MMHG | SYSTOLIC BLOOD PRESSURE: 96 MMHG | WEIGHT: 202 LBS | HEIGHT: 69 IN

## 2018-08-15 DIAGNOSIS — I42.9 CARDIOMYOPATHY, UNSPECIFIED TYPE (HCC): ICD-10-CM

## 2018-08-15 DIAGNOSIS — R07.9 CHEST PAIN, UNSPECIFIED TYPE: ICD-10-CM

## 2018-08-15 DIAGNOSIS — R94.39 ABNORMAL NUCLEAR STRESS TEST: ICD-10-CM

## 2018-08-15 DIAGNOSIS — E11.9 TYPE 2 DIABETES MELLITUS WITHOUT COMPLICATION, WITHOUT LONG-TERM CURRENT USE OF INSULIN (HCC): ICD-10-CM

## 2018-08-15 DIAGNOSIS — I50.21 SYSTOLIC CHF, ACUTE (HCC): ICD-10-CM

## 2018-08-15 DIAGNOSIS — E78.5 HYPERLIPIDEMIA, UNSPECIFIED HYPERLIPIDEMIA TYPE: Primary | ICD-10-CM

## 2018-08-15 DIAGNOSIS — E78.5 HYPERLIPIDEMIA, UNSPECIFIED HYPERLIPIDEMIA TYPE: ICD-10-CM

## 2018-08-15 DIAGNOSIS — I50.21 SYSTOLIC CHF, ACUTE (HCC): Primary | ICD-10-CM

## 2018-08-15 LAB
ANION GAP SERPL CALC-SCNC: 8 MMOL/L (ref 3–18)
BASOPHILS # BLD: 0 K/UL (ref 0–0.1)
BASOPHILS NFR BLD: 1 % (ref 0–2)
BUN SERPL-MCNC: 23 MG/DL (ref 7–18)
BUN/CREAT SERPL: 19 (ref 12–20)
CALCIUM SERPL-MCNC: 8.5 MG/DL (ref 8.5–10.1)
CHLORIDE SERPL-SCNC: 105 MMOL/L (ref 100–108)
CHOLEST SERPL-MCNC: 183 MG/DL
CO2 SERPL-SCNC: 30 MMOL/L (ref 21–32)
CREAT SERPL-MCNC: 1.21 MG/DL (ref 0.6–1.3)
DIFFERENTIAL METHOD BLD: ABNORMAL
EOSINOPHIL # BLD: 0.2 K/UL (ref 0–0.4)
EOSINOPHIL NFR BLD: 4 % (ref 0–5)
ERYTHROCYTE [DISTWIDTH] IN BLOOD BY AUTOMATED COUNT: 12.4 % (ref 11.6–14.5)
GLUCOSE SERPL-MCNC: 100 MG/DL (ref 74–99)
HCT VFR BLD AUTO: 38.9 % (ref 36–48)
HDLC SERPL-MCNC: 50 MG/DL (ref 40–60)
HDLC SERPL: 3.7 {RATIO} (ref 0–5)
HGB BLD-MCNC: 14 G/DL (ref 13–16)
INR PPP: 1.3 (ref 0.8–1.2)
LDLC SERPL CALC-MCNC: 119.4 MG/DL (ref 0–100)
LIPID PROFILE,FLP: ABNORMAL
LYMPHOCYTES # BLD: 2 K/UL (ref 0.9–3.6)
LYMPHOCYTES NFR BLD: 40 % (ref 21–52)
MCH RBC QN AUTO: 29.9 PG (ref 24–34)
MCHC RBC AUTO-ENTMCNC: 36 G/DL (ref 31–37)
MCV RBC AUTO: 83.1 FL (ref 74–97)
MONOCYTES # BLD: 0.6 K/UL (ref 0.05–1.2)
MONOCYTES NFR BLD: 11 % (ref 3–10)
NEUTS SEG # BLD: 2.3 K/UL (ref 1.8–8)
NEUTS SEG NFR BLD: 44 % (ref 40–73)
PLATELET # BLD AUTO: 234 K/UL (ref 135–420)
PMV BLD AUTO: 10.9 FL (ref 9.2–11.8)
POTASSIUM SERPL-SCNC: 4.4 MMOL/L (ref 3.5–5.5)
PROTHROMBIN TIME: 16.2 SEC (ref 11.5–15.2)
RBC # BLD AUTO: 4.68 M/UL (ref 4.7–5.5)
SODIUM SERPL-SCNC: 143 MMOL/L (ref 136–145)
TRIGL SERPL-MCNC: 68 MG/DL (ref ?–150)
VLDLC SERPL CALC-MCNC: 13.6 MG/DL
WBC # BLD AUTO: 5.1 K/UL (ref 4.6–13.2)

## 2018-08-15 PROCEDURE — 85025 COMPLETE CBC W/AUTO DIFF WBC: CPT | Performed by: INTERNAL MEDICINE

## 2018-08-15 PROCEDURE — 80048 BASIC METABOLIC PNL TOTAL CA: CPT | Performed by: INTERNAL MEDICINE

## 2018-08-15 PROCEDURE — 36415 COLL VENOUS BLD VENIPUNCTURE: CPT | Performed by: INTERNAL MEDICINE

## 2018-08-15 PROCEDURE — 80061 LIPID PANEL: CPT | Performed by: INTERNAL MEDICINE

## 2018-08-15 PROCEDURE — 85610 PROTHROMBIN TIME: CPT | Performed by: INTERNAL MEDICINE

## 2018-08-15 RX ORDER — GLIMEPIRIDE 4 MG/1
TABLET ORAL
COMMUNITY
End: 2019-02-08

## 2018-08-15 RX ORDER — ERGOCALCIFEROL 1.25 MG/1
50000 CAPSULE ORAL
COMMUNITY
End: 2018-11-16

## 2018-08-15 NOTE — TELEPHONE ENCOUNTER
Called and spoke to patient to increase simvastatin to 40 mg a day and repeat Lipid/LFT in 4 weeks. D/t High LDL. He voices understanding and acceptance of this advice and will call back if any further questions or concerns.

## 2018-08-15 NOTE — LETTER
Jeffrey Danette 1955 8/15/2018 Dear Nicole Mims MD 
 
I had the pleasure of evaluating  Mr. Raul Geiger in office today. Below are the relevant portions of my assessment and plan of care. ICD-10-CM ICD-9-CM 1. Systolic CHF, acute ([facility]) N40.10 534.38 METABOLIC PANEL, BASIC  
  428.0 CBC WITH AUTOMATED DIFF PROTHROMBIN TIME + INR  
   LIPID PANEL  
   CARDIAC CATHETERIZATION New onset of increasing shortness of breath and abnormal echo and nuclear stress test.  Ejection fraction 15-20% 2. Cardiomyopathy, unspecified type ([facility] [address] I42.9 425.4   
 15 percent ejection fraction on echo. Worsening ejection fraction from 5  
3. Abnormal nuclear stress test V89.27 421.06 METABOLIC PANEL, BASIC  
   CBC WITH AUTOMATED DIFF PROTHROMBIN TIME + INR  
   LIPID PANEL  
   CARDIAC CATHETERIZATION Fixed inferior defect. Possible old MI although ejection fraction reduction is out of proportion to abnormality on the perfusion 4. Hyperlipidemia, unspecified hyperlipidemia type E78.5 272.4 Continue treatment 5. Chest pain, unspecified type R07.9 786.50 Possible angina. 6. Type 2 diabetes mellitus without complication, without long-term current use of insulin ([facility]) E11.9 250.00 Current Outpatient Prescriptions Medication Sig Dispense Refill  ergocalciferol (VITAMIN D2) 50,000 unit capsule Take 50,000 Units by mouth.  glimepiride (AMARYL) 4 mg tablet Take  by mouth every morning.  carvedilol (COREG) 3.125 mg tablet Take 1 Tab by mouth two (2) times daily (with meals) for 30 days. 60 Tab 0  
 digoxin (LANOXIN) 0.125 mg tablet Take 1 Tab by mouth daily for 30 days. (Patient taking differently: Take 125 mcg by mouth daily.) 30 Tab 0  
 lisinopril (PRINIVIL, ZESTRIL) 5 mg tablet Take 1 Tab by mouth daily for 30 days. 30 Tab 0  
 furosemide (LASIX) 40 mg tablet Take 1 Tab by mouth daily for 30 days.  30 Tab 0  
  spironolactone (ALDACTONE) 25 mg tablet Take 1 Tab by mouth daily for 30 days. 30 Tab 0  
 furosemide (LASIX) 20 mg tablet Take  by mouth daily.  metFORMIN (GLUCOPHAGE) 1,000 mg tablet Take 1,000 mg by mouth two (2) times daily (with meals).  metaxalone (SKELAXIN) 800 mg tablet Take 1 Tab by mouth every six (6) hours as needed for Pain (muscle spasm). 20 Tab 0  
 aspirin 81 mg chewable tablet Take 1 Tab by mouth daily. 30 Tab 0  
 simvastatin (ZOCOR) 20 mg tablet Take  by mouth nightly. Orders Placed This Encounter  METABOLIC PANEL, BASIC Standing Status:   Future Standing Expiration Date:   9/14/2018  CBC WITH AUTOMATED DIFF Standing Status:   Future Standing Expiration Date:   9/14/2018  PROTHROMBIN TIME + INR Standing Status:   Future Standing Expiration Date:   9/14/2018  LIPID PANEL Standing Status:   Future Standing Expiration Date:   2/13/2019  CARDIAC CATHETERIZATION Standing Status:   Future Standing Expiration Date:   2/14/2019 Order Specific Question:   Reason for Exam: Answer:   systolic chf  
 ergocalciferol (VITAMIN D2) 50,000 unit capsule Sig: Take 50,000 Units by mouth.  glimepiride (AMARYL) 4 mg tablet Sig: Take  by mouth every morning. If you have questions, please do not hesitate to call me. I look forward to following Mr. Abdelrahman Delgadillo along with you. Sincerely, Adriana Simpson MD

## 2018-08-15 NOTE — MR AVS SNAPSHOT
Slava Flores 
 
 
 [address], [address] [facility] [address] 
[phone] Patient: Ciara Escobar MRN: [id number] Visit Information Date & Time Provider Department Dept. Phone Encounter #  
 8/15/2018  9:30 AM Ancelmo Gordon MD [facility] [address] [place] [address] [id number] Follow-up Instructions Return in about 4 weeks (around 2018). Your Appointments 2018 10:00 AM  
Office Visit with Ancelmo Gordon MD  
[facility]) Appt Note: post cath [address], [address] [facility] [address] [facility], [address] [place] [address] Upcoming Health Maintenance Date Due Hepatitis C Screening 1954 HEMOGLOBIN A1C Q6M 1954 LIPID PANEL Q1 1954 FOOT EXAM Q1 1964 MICROALBUMIN Q1 1964 EYE EXAM RETINAL OR DILATED Q1 1964 Pneumococcal 19-64 Medium Risk (1 of 1 - PPSV23) 1973 DTaP/Tdap/Td series (1 - Tdap) 1975 FOBT Q 1 YEAR AGE 50-75 2004 ZOSTER VACCINE AGE 60> 2014 Influenza Age 5 to Adult 2018 Allergies as of 8/15/2018  Review Complete On: 8/15/2018 By: Ancelmo Gordon MD  
 No Known Allergies Current Immunizations  Never Reviewed No immunizations on file. Not reviewed this visit You Were Diagnosed With   
  
 Codes Comments Systolic CHF, acute ([address]    -  Primary ICD-10-CM: I50.21 ICD-9-CM: 428.21, 428.0 New onset of increasing shortness of breath and abnormal echo and nuclear stress test.  Ejection fraction 15-20% Cardiomyopathy, unspecified type ([address]     ICD-10-CM: I42.9 ICD-9-CM: 425.4 15 percent ejection fraction on echo. Worsening ejection fraction from 5 Abnormal nuclear stress test     ICD-10-CM: R94.39 
ICD-9-CM: 794.39 Fixed inferior defect.   Possible old MI although ejection fraction reduction is out of proportion to abnormality on the perfusion Hyperlipidemia, unspecified hyperlipidemia type     ICD-10-CM: E78.5 ICD-9-CM: 272.4 Continue treatment Chest pain, unspecified type     ICD-10-CM: R07.9 ICD-9-CM: 786.50 Possible angina. Type 2 diabetes mellitus without complication, without long-term current use of insulin (HCC)     ICD-10-CM: E11.9 ICD-9-CM: 250.00 Vitals BP Pulse Height(growth percentile) Weight(growth percentile) BMI Smoking Status 96/63 89 5' 9\" (1.753 m) 202 lb (91.6 kg) 29.83 kg/m2 Former Smoker Vitals History BMI and BSA Data Body Mass Index Body Surface Area  
 29.83 kg/m 2 2.11 m 2 Preferred Pharmacy Pharmacy Name Phone [id number] Betancourt, [address] [place] [address] Your Updated Medication List  
  
   
This list is accurate as of 8/15/18  9:40 AM.  Always use your most recent med list.  
  
  
  
  
 aspirin 81 mg chewable tablet Take 1 Tab by mouth daily. carvedilol 3.125 mg tablet Commonly known as:  Tello Pintos Take 1 Tab by mouth two (2) times daily (with meals) for 30 days. digoxin 0.125 mg tablet Commonly known as:  LANOXIN Take 1 Tab by mouth daily for 30 days. glimepiride 4 mg tablet Commonly known as:  AMARYL Take  by mouth every morning. * LASIX 20 mg tablet Generic drug:  furosemide Take  by mouth daily. * furosemide 40 mg tablet Commonly known as:  LASIX Take 1 Tab by mouth daily for 30 days. lisinopril 5 mg tablet Commonly known as:  Michaelyn [place] Take 1 Tab by mouth daily for 30 days. metaxalone 800 mg tablet Commonly known as:  SKELAXIN Take 1 Tab by mouth every six (6) hours as needed for Pain (muscle spasm). metFORMIN 1,000 mg tablet Commonly known as:  GLUCOPHAGE Take 1,000 mg by mouth two (2) times daily (with meals). simvastatin 20 mg tablet Commonly known as:  ZOCOR  
 Take  by mouth nightly. spironolactone 25 mg tablet Commonly known as:  ALDACTONE Take 1 Tab by mouth daily for 30 days. VITAMIN D2 50,000 unit capsule Generic drug:  ergocalciferol Take 50,000 Units by mouth. * Notice: This list has 2 medication(s) that are the same as other medications prescribed for you. Read the directions carefully, and ask your doctor or other care provider to review them with you. Follow-up Instructions Return in about 4 weeks (around 9/12/2018). To-Do List   
 08/15/2018 Cardiac Services:  CARDIAC CATHETERIZATION   
  
 08/15/2018 Lab:  LIPID PANEL   
  
 08/22/2018 Lab:  CBC WITH AUTOMATED DIFF   
  
 08/22/2018 Lab:  METABOLIC PANEL, BASIC   
  
 08/22/2018 Lab:  PROTHROMBIN TIME + INR Introducing [facility] SERVICES! [facility] introduces [employer] patient portal. Now you can access parts of your medical record, email your doctor's office, and request medication refills online. 1. In your internet browser, go to [email or web address] 2. Click on the First Time User? Click Here link in the Sign In box. You will see the New Member Sign Up page. 3. Enter your [employer] Access Code exactly as it appears below. You will not need to use this code after youve completed the sign-up process. If you do not sign up before the expiration date, you must request a new code. · [employer] Access Code: [email or web address] Expires: 8/19/2018 10:13 AM 
 
4. Enter the last four digits of your Social Security Number (xxxx) and Date of Birth (mm/dd/yyyy) as indicated and click Submit. You will be taken to the next sign-up page. 5. Create a [employer] ID. This will be your [employer] login ID and cannot be changed, so think of one that is secure and easy to remember. 6. Create a [employer] password. You can change your password at any time. 7. Enter your Password Reset Question and Answer.  This can be used at a later time if you forget your password. 8. Enter your e-mail address. You will receive e-mail notification when new information is available in [address] 9. Click Sign Up. You can now view and download portions of your medical record. 10. Click the Download Summary menu link to download a portable copy of your medical information. If you have questions, please visit the Frequently Asked Questions section of the [employer] website. Remember, [employer] is NOT to be used for urgent needs. For medical emergencies, dial 911. Now available from your iPhone and Android! Please provide this summary of care documentation to your next provider. Your primary care clinician is listed as Clark Abdalla. If you have any questions after today's visit, please call [phone].

## 2018-08-15 NOTE — PROGRESS NOTES
HISTORY OF PRESENT ILLNESS  Blossom Martini is a 60 y.o. male. HPI Comments: Patient is here for follow up of diagnostic tests. Results will be discussed. Cardiomyopathy   The history is provided by the patient. This is a new problem. The problem occurs constantly. The problem has been gradually worsening. Associated symptoms include chest pain and shortness of breath. Chest Pain (Angina)    The history is provided by the patient. This is a new problem. Episode onset: 6 month. The problem occurs every several days. The pain is associated with exertion and rest. The pain is present in the substernal region. The pain is mild. The quality of the pain is described as tightness and heavy. The pain does not radiate. Associated symptoms include shortness of breath. Pertinent negatives include no claudication, no cough, no dizziness, no fever, no hemoptysis, no nausea, no orthopnea, no palpitations, no PND, no sputum production, no vomiting and no weakness. Shortness of Breath   The history is provided by the patient. This is a new problem. The problem occurs intermittently. The problem has not changed since onset. Associated symptoms include chest pain. Pertinent negatives include no fever, no cough, no sputum production, no hemoptysis, no wheezing, no PND, no orthopnea, no vomiting, no rash, no leg swelling and no claudication. The problem's precipitants include exercise. Review of Systems   Constitutional: Negative for chills and fever. HENT: Negative for nosebleeds. Eyes: Negative for blurred vision and double vision. Respiratory: Positive for shortness of breath. Negative for cough, hemoptysis, sputum production and wheezing. Cardiovascular: Positive for chest pain. Negative for palpitations, orthopnea, claudication, leg swelling and PND. Gastrointestinal: Negative for heartburn, nausea and vomiting. Musculoskeletal: Negative for myalgias. Skin: Negative for rash.    Neurological: Negative for dizziness and weakness. Endo/Heme/Allergies: Does not bruise/bleed easily. Family History   Problem Relation Age of Onset    Heart Disease Mother     No Known Problems Father     Heart Disease Brother      enlarged heart       Past Medical History:   Diagnosis Date    Arthritis     Cardiomyopathy ([facility] 7/13/2018    EF reported 45-50% in 2010    Chest pain 7/13/2018    Will anginal pain. Less likely GERD chest wall    Hyperlipidemia 7/13/2018    On statin. Lab with PCP    Nonspecific abnormal electrocardiogram (ECG) (EKG)     Obesity, unspecified     Other and unspecified hyperlipidemia     Other primary cardiomyopathies     Shortness of breath     SOB (shortness of breath) 7/13/2018    Possible CHF, HCVD, angina equivalent.  Type 2 diabetes mellitus without complication, without long-term current use of insulin ([facility] 7/13/2018    On metformin. Lab with PCP    Type II or unspecified type diabetes mellitus without mention of complication, not stated as uncontrolled        No past surgical history on file. Social History   Substance Use Topics    Smoking status: Former Smoker     Quit date: 12/6/1998    Smokeless tobacco: Never Used    Alcohol use No      Comment: former heavy drinker       No Known Allergies    Prior to Admission medications    Medication Sig Start Date End Date Taking? Authorizing Provider   ergocalciferol (VITAMIN D2) 50,000 unit capsule Take 50,000 Units by mouth. Yes Historical Provider   glimepiride (AMARYL) 4 mg tablet Take  by mouth every morning. Yes Historical Provider   carvedilol (COREG) 3.125 mg tablet Take 1 Tab by mouth two (2) times daily (with meals) for 30 days. 8/3/18 9/2/18 Yes Shari Chapin MD   digoxin (LANOXIN) 0.125 mg tablet Take 1 Tab by mouth daily for 30 days. Patient taking differently: Take 125 mcg by mouth daily.  8/3/18 9/2/18 Yes Shari Chapin MD   lisinopril (PRINIVIL, ZESTRIL) 5 mg tablet Take 1 Tab by mouth daily for 30 days. 8/3/18 9/2/18 Yes Francisco Quezada MD   furosemide (LASIX) 40 mg tablet Take 1 Tab by mouth daily for 30 days. 8/3/18 9/2/18 Yes Francisco Quezada MD   spironolactone (ALDACTONE) 25 mg tablet Take 1 Tab by mouth daily for 30 days. 8/3/18 9/2/18 Yes Francisco Quezada MD   furosemide (LASIX) 20 mg tablet Take  by mouth daily. Yes Historical Provider   metFORMIN (GLUCOPHAGE) 1,000 mg tablet Take 1,000 mg by mouth two (2) times daily (with meals). Yes Historical Provider   metaxalone (SKELAXIN) 800 mg tablet Take 1 Tab by mouth every six (6) hours as needed for Pain (muscle spasm). 8/12/18   Lenn Lesch, PA-C   aspirin 81 mg chewable tablet Take 1 Tab by mouth daily. 6/22/18   Alex Beatty MD   simvastatin (ZOCOR) 20 mg tablet Take  by mouth nightly. Historical Provider         Visit Vitals    BP 96/63    Pulse 89    Ht 5' 9\" (1.753 m)    Wt 91.6 kg (202 lb)    BMI 29.83 kg/m2         Physical Exam   Constitutional: He is oriented to person, place, and time. He appears well-developed and well-nourished. HENT:   Head: Normocephalic and atraumatic. Left eye blindness   Eyes: Conjunctivae are normal.   Neck: Neck supple. No JVD present. No tracheal deviation present. No thyromegaly present. Cardiovascular: Normal rate, regular rhythm and normal heart sounds. Exam reveals no gallop and no friction rub. No murmur heard. Pulmonary/Chest: No respiratory distress. He has no wheezes. He has rales. He exhibits no tenderness. Abdominal: Soft. There is no tenderness. Musculoskeletal: He exhibits no edema. Neurological: He is alert and oriented to person, place, and time. stuttering   Skin: Skin is warm and dry. Psychiatric: He has a normal mood and affect. Mr. Nga Arguelles has a reminder for a \"due or due soon\" health maintenance. I have asked that he contact his primary care provider for follow-up on this health maintenance.     6/2018  I have personally reviewed patient's records available from hospital and other providers and incorporated findings in patient care. I have personally reviewed patients ekg done at other facility. I Have personally reviewed recent relevant labs available and discussed with patient  Interpretation Summary 8/2018  · Left ventricular severely decreased systolic function. Calculated left ventricular ejection fraction is 15%. Left ventricular cavity size is severely dilated. Left ventricular global hypokinesis. Severe (grade 3) left ventricular diastolic dysfunction. · Right ventricular cavity size is severely dilated. Right ventricular global systolic function is severely reduced. Right ventricular findings are consistent with Ramos's sign. · Right atrial cavity size is severely dilated. · Mild aortic valve sclerosis. · Mild mitral valve regurgitation. · Mild tricuspid valve regurgitation is present. Pulmonary arterial systolic pressure is 53 mmHg. Moderate pulmonary hypertension is present. · Severely elevated central venous pressure (15+ mmHg); IVC diameter is larger than 21 mm and collapses less than 50% with respiration. · Mild pulmonic valve regurgitation is present. Procedure Conclusion   Nuclear Stress Test 8/2018  Abnormal myocardial perfusion imaging. Fixed defect consistent with prior myocardial infarction. Myocardial perfusion imaging supports a high risk stress test.   There is a prior study available for comparison. Moderate size moderate intensity fixed defect of inferior wall. This is associated with severe global hypokinesis and severely reduced systolic function. This could indicate area of infarction in inferior wall but more likely represents nonischemic cardiomyopathy      Interpretation Summary 8/2018  · Gated SPECT: Left ventricular function post-stress was abnormal. Calculated ejection fraction is 20%. · Baseline ECG: Normal sinus rhythm, non-specific ST-T wave abnormalities.   · Inconclusive stress electrocardiogram.  · Myocardial perfusion imaging defect 1: There is a defect that is moderate in size with a moderate reduction in uptake present in the apical to basal inferior location(s) that is non-reversible. There is abnormal wall motion in the defect area. Viability in the area is poor. The defect appears to be infarction. · Abnormal myocardial perfusion imaging. Fixed defect consistent with prior myocardial infarction. Myocardial perfusion imaging supports a high risk stress test.           Assessment         ICD-10-CM ICD-9-CM    1. Systolic CHF, acute ([facility]) P23.59 886.32 METABOLIC PANEL, BASIC     428.0 CBC WITH AUTOMATED DIFF      PROTHROMBIN TIME + INR      LIPID PANEL      CARDIAC CATHETERIZATION    New onset of increasing shortness of breath and abnormal echo and nuclear stress test.  Ejection fraction 15-20%   2. Cardiomyopathy, unspecified type ([address] I42.9 425.4     15 percent ejection fraction on echo. Worsening ejection fraction from 5   3. Abnormal nuclear stress test B25.24 373.97 METABOLIC PANEL, BASIC      CBC WITH AUTOMATED DIFF      PROTHROMBIN TIME + INR      LIPID PANEL      CARDIAC CATHETERIZATION    Fixed inferior defect. Possible old MI although ejection fraction reduction is out of proportion to abnormality on the perfusion   4. Hyperlipidemia, unspecified hyperlipidemia type E78.5 272.4     Continue treatment   5. Chest pain, unspecified type R07.9 786.50     Possible angina. 6. Type 2 diabetes mellitus without complication, without long-term current use of insulin ([facility]) E11.9 250.00    7/2018  Symptoms suggestive of congestive heart failure. Continue with the Lasix. Have not added any ACE inhibitor due to low blood pressure. Will get echo and stress test.  Further plans based on above. 8/2018  Worsening congestive heart failure and cardiomyopathy. Abnormal nuclear scan. Will proceed with cardiac catheterization with possible PCI. Symptoms of CHF appears to be improving. Blood pressure low normal so I would not increase any medication at present. THE PATIENT UNDERSTANDS THAT ALTHOUGH RARE, SEVERE  UNEXPECTED COMPLICATIONS CAN OCCUR WITH EACH TYPE OF CARDIAC CATH PROCEDURE. THESE RISKS INCLUDE,  BUT ARE NOT LIMITED TO: ALLERGIC REACTION, INFECTION, BLEEDING, BLOOD VESSEL INJURY,   KIDNEY INJURY FROM X-RAY DYE, PUNCTURE OF THE HEART/LUNGS,   EMERGENT OPEN HEART SURGERY, HEART ATTACK, STROKE, CARDIAC  ARREST OR DEATH OR NEED FOR EMERGENCY CARDIAC BYPASS SURGERY       There are no discontinued medications. Orders Placed This Encounter    METABOLIC PANEL, BASIC     Standing Status:   Future     Standing Expiration Date:   9/14/2018    CBC WITH AUTOMATED DIFF     Standing Status:   Future     Standing Expiration Date:   9/14/2018    PROTHROMBIN TIME + INR     Standing Status:   Future     Standing Expiration Date:   9/14/2018    LIPID PANEL     Standing Status:   Future     Standing Expiration Date:   2/13/2019    CARDIAC CATHETERIZATION     Standing Status:   Future     Standing Expiration Date:   2/14/2019     Order Specific Question:   Reason for Exam:     Answer:   systolic chf       Follow-up Disposition:  Return in about 4 weeks (around 9/12/2018).

## 2018-08-15 NOTE — PROGRESS NOTES
1. Have you been to the ER, urgent care clinic since your last visit? Hospitalized since your last visit? No    2. Have you seen or consulted any other health care providers outside of the [address] [place] [address] since your last visit? Include any pap smears or colon screening.  No

## 2018-08-15 NOTE — TELEPHONE ENCOUNTER
----- Message from Ranjit Gramajo MD sent at 8/15/2018 12:56 PM EDT -----  High LDL. Increase simvastatin to 40 mg a day.   Lipid LFT 4 weeks

## 2018-08-21 ENCOUNTER — HOSPITAL ENCOUNTER (OUTPATIENT)
Age: 63
Setting detail: OUTPATIENT SURGERY
Discharge: HOME | End: 2018-08-21
Attending: INTERNAL MEDICINE | Admitting: INTERNAL MEDICINE
Payer: COMMERCIAL

## 2018-08-21 VITALS
HEART RATE: 90 BPM | DIASTOLIC BLOOD PRESSURE: 67 MMHG | RESPIRATION RATE: 22 BRPM | HEIGHT: 69 IN | OXYGEN SATURATION: 100 % | WEIGHT: 198 LBS | SYSTOLIC BLOOD PRESSURE: 96 MMHG | BODY MASS INDEX: 29.33 KG/M2

## 2018-08-21 DIAGNOSIS — I50.21 ACC/AHA STAGE C ACUTE SYSTOLIC HEART FAILURE (HCC): ICD-10-CM

## 2018-08-21 PROBLEM — I25.119 CORONARY ARTERY DISEASE INVOLVING NATIVE CORONARY ARTERY OF NATIVE HEART WITH ANGINA PECTORIS (HCC): Status: ACTIVE | Noted: 2018-08-21

## 2018-08-21 LAB
CRD SYSTOLIC BP: 98
END DIASTOLIC PRESSURE: 8
GLUCOSE BLD STRIP.AUTO-MCNC: 132 MG/DL (ref 70–110)

## 2018-08-21 PROCEDURE — 74011250637 HC RX REV CODE- 250/637: Performed by: INTERNAL MEDICINE

## 2018-08-21 PROCEDURE — C1751 CATH, INF, PER/CENT/MIDLINE: HCPCS | Performed by: INTERNAL MEDICINE

## 2018-08-21 PROCEDURE — C1894 INTRO/SHEATH, NON-LASER: HCPCS | Performed by: INTERNAL MEDICINE

## 2018-08-21 PROCEDURE — 74011636320 HC RX REV CODE- 636/320: Performed by: INTERNAL MEDICINE

## 2018-08-21 PROCEDURE — 77030013797 HC KT TRNSDUC PRSSR EDWD -A: Performed by: INTERNAL MEDICINE

## 2018-08-21 PROCEDURE — 77030004534 HC CATH ANGI DX INFN CARD -A: Performed by: INTERNAL MEDICINE

## 2018-08-21 PROCEDURE — 82962 GLUCOSE BLOOD TEST: CPT

## 2018-08-21 PROCEDURE — 99153 MOD SED SAME PHYS/QHP EA: CPT | Performed by: INTERNAL MEDICINE

## 2018-08-21 PROCEDURE — 74011250636 HC RX REV CODE- 250/636: Performed by: INTERNAL MEDICINE

## 2018-08-21 PROCEDURE — 74011250636 HC RX REV CODE- 250/636

## 2018-08-21 PROCEDURE — 99152 MOD SED SAME PHYS/QHP 5/>YRS: CPT | Performed by: INTERNAL MEDICINE

## 2018-08-21 PROCEDURE — 93460 R&L HRT ART/VENTRICLE ANGIO: CPT | Performed by: INTERNAL MEDICINE

## 2018-08-21 RX ORDER — ACETAMINOPHEN 325 MG/1
650 TABLET ORAL
Status: DISCONTINUED | OUTPATIENT
Start: 2018-08-21 | End: 2018-08-21 | Stop reason: HOSPADM

## 2018-08-21 RX ORDER — HYDRALAZINE HYDROCHLORIDE 20 MG/ML
10 INJECTION INTRAMUSCULAR; INTRAVENOUS
Status: DISCONTINUED | OUTPATIENT
Start: 2018-08-21 | End: 2018-08-21 | Stop reason: HOSPADM

## 2018-08-21 RX ORDER — FENTANYL CITRATE 50 UG/ML
INJECTION, SOLUTION INTRAMUSCULAR; INTRAVENOUS AS NEEDED
Status: DISCONTINUED | OUTPATIENT
Start: 2018-08-21 | End: 2018-08-21 | Stop reason: HOSPADM

## 2018-08-21 RX ORDER — LIDOCAINE HYDROCHLORIDE 10 MG/ML
INJECTION, SOLUTION EPIDURAL; INFILTRATION; INTRACAUDAL; PERINEURAL AS NEEDED
Status: DISCONTINUED | OUTPATIENT
Start: 2018-08-21 | End: 2018-08-21 | Stop reason: HOSPADM

## 2018-08-21 RX ORDER — SODIUM CHLORIDE 0.9 % (FLUSH) 0.9 %
5-10 SYRINGE (ML) INJECTION AS NEEDED
Status: DISCONTINUED | OUTPATIENT
Start: 2018-08-21 | End: 2018-08-21 | Stop reason: HOSPADM

## 2018-08-21 RX ORDER — NITROGLYCERIN 0.4 MG/1
0.4 TABLET SUBLINGUAL
Status: DISCONTINUED | OUTPATIENT
Start: 2018-08-21 | End: 2018-08-21 | Stop reason: HOSPADM

## 2018-08-21 RX ORDER — SODIUM CHLORIDE 0.9 % (FLUSH) 0.9 %
5-10 SYRINGE (ML) INJECTION EVERY 8 HOURS
Status: DISCONTINUED | OUTPATIENT
Start: 2018-08-21 | End: 2018-08-21 | Stop reason: HOSPADM

## 2018-08-21 RX ORDER — SODIUM CHLORIDE 9 MG/ML
100 INJECTION, SOLUTION INTRAVENOUS CONTINUOUS
Status: DISPENSED | OUTPATIENT
Start: 2018-08-21 | End: 2018-08-21

## 2018-08-21 RX ORDER — GUAIFENESIN 100 MG/5ML
81 LIQUID (ML) ORAL ONCE
Status: COMPLETED | OUTPATIENT
Start: 2018-08-21 | End: 2018-08-21

## 2018-08-21 RX ORDER — MIDAZOLAM HYDROCHLORIDE 1 MG/ML
INJECTION, SOLUTION INTRAMUSCULAR; INTRAVENOUS AS NEEDED
Status: DISCONTINUED | OUTPATIENT
Start: 2018-08-21 | End: 2018-08-21 | Stop reason: HOSPADM

## 2018-08-21 RX ADMIN — ASPIRIN 81 MG 81 MG: 81 TABLET ORAL at 11:05

## 2018-08-21 NOTE — Clinical Note
Contrast Dose Calculator:  
Patient's age: 60.  
Patient's sex: Male. Patient weight (kg) = 89.8. Creatinine level (mg/dL) = 1.21. Creatinine clearance (mL/min): 79.  
Contrast concentration (mg/mL) = 300. MACD = 300 mL. Max Contrast dose per Creatinine Cl calculator = 177.75 mL.

## 2018-08-21 NOTE — Clinical Note
TRANSFER - OUT REPORT:  
 
Verbal report given to: Aeysha Lewis RN. Report consisted of patient's Situation, Background, Assessment and  
Recommendations(SBAR). Opportunity for questions and clarification was provided. Patient transported with a Cardiac Cath Tech / Patient Care Tech. Patient transported to: [address]

## 2018-08-21 NOTE — ROUTINE PROCESS
Pt brought to [place] [address] ambulatory. Pt placed in bay 7 and connected to monitor. Baseline VS taken and PIV started x 2. Admission database completed. Pt ready for ordered procedure.

## 2018-08-21 NOTE — Clinical Note
Sheath #1: Dressed using 4 X 4 and transparent dressing. Site: clean, dry, & intact and no bleeding.

## 2018-08-21 NOTE — Clinical Note
TRANSFER - IN REPORT:  
 
Verbal report received from: Karlene Wyatt RN. Beatrice Singh Report consisted of patient's Situation, Background, Assessment and  
Recommendations(SBAR). Opportunity for questions and clarification was provided. Assessment completed upon patient's arrival to unit and care assumed. Patient transported with a Cardiac Cath Tech / Patient Care Tech.

## 2018-08-21 NOTE — H&P
Pt seen and examined, chart reviewed prior to procedure  No changes since last office visit. See office visit from 8/15/2018    Office Visit     8/15/2018  Cardiology Fernando Toscano MD   Cardiology    Systolic CHF, acute [facility]) +5 more   Dx    Cardiomyopathy    Reason for visit    Progress Notes      HISTORY OF PRESENT ILLNESS  Eric Alcala is a 60 y.o. male.     HPI Comments: Patient is here for follow up of diagnostic tests. Results will be discussed.     Cardiomyopathy   The history is provided by the patient. This is a new problem. The problem occurs constantly. The problem has been gradually worsening. Associated symptoms include chest pain and shortness of breath. Chest Pain (Angina)    The history is provided by the patient. This is a new problem. Episode onset: 6 month. The problem occurs every several days. The pain is associated with exertion and rest. The pain is present in the substernal region. The pain is mild. The quality of the pain is described as tightness and heavy. The pain does not radiate. Associated symptoms include shortness of breath. Pertinent negatives include no claudication, no cough, no dizziness, no fever, no hemoptysis, no nausea, no orthopnea, no palpitations, no PND, no sputum production, no vomiting and no weakness. Shortness of Breath   The history is provided by the patient. This is a new problem. The problem occurs intermittently. The problem has not changed since onset. Associated symptoms include chest pain. Pertinent negatives include no fever, no cough, no sputum production, no hemoptysis, no wheezing, no PND, no orthopnea, no vomiting, no rash, no leg swelling and no claudication. The problem's precipitants include exercise.         Review of Systems   Constitutional: Negative for chills and fever. HENT: Negative for nosebleeds. Eyes: Negative for blurred vision and double vision. Respiratory: Positive for shortness of breath. Negative for cough, hemoptysis, sputum production and wheezing. Cardiovascular: Positive for chest pain. Negative for palpitations, orthopnea, claudication, leg swelling and PND. Gastrointestinal: Negative for heartburn, nausea and vomiting. Musculoskeletal: Negative for myalgias. Skin: Negative for rash. Neurological: Negative for dizziness and weakness. Endo/Heme/Allergies: Does not bruise/bleed easily.             Family History   Problem Relation Age of Onset    Heart Disease Mother      No Known Problems Father      Heart Disease Brother         enlarged heart              Past Medical History:   Diagnosis Date    Arthritis      Cardiomyopathy ([facility] [address] 7/13/2018     EF reported 45-50% in 2010    Chest pain 7/13/2018     Will anginal pain. Less likely GERD chest wall    Hyperlipidemia 7/13/2018     On statin. Lab with PCP    Nonspecific abnormal electrocardiogram (ECG) (EKG)      Obesity, unspecified      Other and unspecified hyperlipidemia      Other primary cardiomyopathies      Shortness of breath      SOB (shortness of breath) 7/13/2018     Possible CHF, HCVD, angina equivalent.  Type 2 diabetes mellitus without complication, without long-term [facility] 7/13/2018     On metformin. Lab with PCP    Type II or unspecified type diabetes mellitus without mention of complication, not stated as uncontrolled           No past surgical history on file.            Social History   Substance Use Topics    Smoking status: Former Smoker       Quit date: 12/6/1998    Smokeless tobacco: Never Used    Alcohol use No         Comment: former heavy drinker         No Known Allergies             Prior to Admission medications    Medication Sig Start Date End Date Taking?  Authorizing Provider   ergocalciferol (VITAMIN D2) 50,000 unit capsule Take 50,000 Units by mouth.     Yes Historical Provider   glimepiride (AMARYL) 4 mg tablet Take  by mouth every morning.     Yes Historical Provider   carvedilol (COREG) 3.125 mg tablet Take 1 Tab by mouth two (2) times daily (with meals) for 30 days. 8/3/18 9/2/18 Yes Manjinder Abreu MD   digoxin (LANOXIN) 0.125 mg tablet Take 1 Tab by mouth daily for 30 days. Patient taking differently: Take 125 mcg by mouth daily. 8/3/18 9/2/18 Yes Manjinder Abreu MD   lisinopril (PRINIVIL, ZESTRIL) 5 mg tablet Take 1 Tab by mouth daily for 30 days. 8/3/18 9/2/18 Yes Manjinder Abreu MD   furosemide (LASIX) 40 mg tablet Take 1 Tab by mouth daily for 30 days. 8/3/18 9/2/18 Yes Manjinder Abreu MD   spironolactone (ALDACTONE) 25 mg tablet Take 1 Tab by mouth daily for 30 days. 8/3/18 9/2/18 Yes Manjinder Abreu MD   furosemide (LASIX) 20 mg tablet Take  by mouth daily.     Yes Historical Provider   metFORMIN (GLUCOPHAGE) 1,000 mg tablet Take 1,000 mg by mouth two (2) times daily (with meals).     Yes Historical Provider   metaxalone (SKELAXIN) 800 mg tablet Take 1 Tab by mouth every six (6) hours as needed for Pain (muscle spasm). 8/12/18     Nayana Graham PA-C   aspirin 81 mg chewable tablet Take 1 Tab by mouth daily. 6/22/18     Harpreet Payton MD   simvastatin (ZOCOR) 20 mg tablet Take  by mouth nightly.       Historical Provider                 Visit Vitals    BP 96/63    Pulse 89    Ht 5' 9\" (1.753 m)    Wt 91.6 kg (202 lb)    BMI 29.83 kg/m2            Physical Exam   Constitutional: He is oriented to person, place, and time. He appears well-developed and well-nourished. HENT:   Head: Normocephalic and atraumatic. Left eye blindness   Eyes: Conjunctivae are normal.   Neck: Neck supple. No JVD present. No tracheal deviation present. No thyromegaly present. Cardiovascular: Normal rate, regular rhythm and normal heart sounds. Exam reveals no gallop and no friction rub. No murmur heard. Pulmonary/Chest: No respiratory distress. He has no wheezes. He has rales. He exhibits no tenderness. Abdominal: Soft.  There is no tenderness. Musculoskeletal: He exhibits no edema. Neurological: He is alert and oriented to person, place, and time. stuttering   Skin: Skin is warm and dry. Psychiatric: He has a normal mood and affect.         Mr. Ruddy Segura has a reminder for a \"due or due soon\" health maintenance. I have asked that he contact his primary care provider for follow-up on this health maintenance.     6/2018  I have personally reviewed patient's records available from hospital and other providers and incorporated findings in patient care. I have personally reviewed patients ekg done at other facility. I Have personally reviewed recent relevant labs available and discussed with patient  Interpretation Summary 8/2018  · Left ventricular severely decreased systolic function. Calculated left ventricular ejection fraction is 15%. Left ventricular cavity size is severely dilated. Left ventricular global hypokinesis. Severe (grade 3) left ventricular diastolic dysfunction. · Right ventricular cavity size is severely dilated. Right ventricular global systolic function is severely reduced. Right ventricular findings are consistent with Ramos's sign. · Right atrial cavity size is severely dilated. · Mild aortic valve sclerosis. · Mild mitral valve regurgitation. · Mild tricuspid valve regurgitation is present. Pulmonary arterial systolic pressure is 53 mmHg. Moderate pulmonary hypertension is present. · Severely elevated central venous pressure (15+ mmHg); IVC diameter is larger than 21 mm and collapses less than 50% with respiration. · Mild pulmonic valve regurgitation is present.      Procedure Conclusion   Nuclear Stress Test 8/2018  Abnormal myocardial perfusion imaging. Fixed defect consistent with prior myocardial infarction. Myocardial perfusion imaging supports a high risk stress test.   There is a prior study available for comparison. Moderate size moderate intensity fixed defect of inferior wall.  This is associated with severe global hypokinesis and severely reduced systolic function. This could indicate area of infarction in inferior wall but more likely represents nonischemic cardiomyopathy      Interpretation Summary 8/2018  · Gated SPECT: Left ventricular function post-stress was abnormal. Calculated ejection fraction is 20%. · Baseline ECG: Normal sinus rhythm, non-specific ST-T wave abnormalities. · Inconclusive stress electrocardiogram.  · Myocardial perfusion imaging defect 1: There is a defect that is moderate in size with a moderate reduction in uptake present in the apical to basal inferior location(s) that is non-reversible. There is abnormal wall motion in the defect area. Viability in the area is poor. The defect appears to be infarction. · Abnormal myocardial perfusion imaging. Fixed defect consistent with prior myocardial infarction. Myocardial perfusion imaging supports a high risk stress test.         Assessment             ICD-10-CM ICD-9-CM     1. Systolic CHF, acute (HCC) J21.69 150.24 METABOLIC PANEL, BASIC       428.0 CBC WITH AUTOMATED DIFF         PROTHROMBIN TIME + INR         LIPID PANEL         CARDIAC CATHETERIZATION     New onset of increasing shortness of breath and abnormal echo and nuclear stress test.  Ejection fraction 15-20%   2. Cardiomyopathy, unspecified type ([address] I42.9 425. 4       15 percent ejection fraction on echo. Worsening ejection fraction from 5   3. Abnormal nuclear stress test Y48.00 135.30 METABOLIC PANEL, BASIC         CBC WITH AUTOMATED DIFF         PROTHROMBIN TIME + INR         LIPID PANEL         CARDIAC CATHETERIZATION     Fixed inferior defect. Possible old MI although ejection fraction reduction is out of proportion to abnormality on the perfusion   4. Hyperlipidemia, unspecified hyperlipidemia type E78.5 272. 4       Continue treatment   5. Chest pain, unspecified type R07.9 786.50       Possible angina.    6. Type 2 diabetes mellitus without complication, without long-term current use of insulin (HCC) E11.9 250.00     7/2018  Symptoms suggestive of congestive heart failure. Continue with the Lasix. Have not added any ACE inhibitor due to low blood pressure. Will get echo and stress test.  Further plans based on above. 8/2018  Worsening congestive heart failure and cardiomyopathy. Abnormal nuclear scan. Will proceed with cardiac catheterization with possible PCI. Symptoms of CHF appears to be improving. Blood pressure low normal so I would not increase any medication at present. THE PATIENT UNDERSTANDS THAT ALTHOUGH RARE, SEVERE  UNEXPECTED COMPLICATIONS CAN OCCUR WITH EACH TYPE OF CARDIAC CATH PROCEDURE. THESE RISKS INCLUDE,  BUT ARE NOT LIMITED TO: ALLERGIC REACTION, INFECTION, BLEEDING, BLOOD VESSEL INJURY,   KIDNEY INJURY FROM X-RAY DYE, PUNCTURE OF THE HEART/LUNGS,   EMERGENT OPEN HEART SURGERY, HEART ATTACK, STROKE, CARDIAC  ARREST OR DEATH OR NEED FOR EMERGENCY CARDIAC BYPASS SURGERY         There are no discontinued medications.           Orders Placed This Encounter    METABOLIC PANEL, BASIC       Standing Status:   Future       Standing Expiration Date:   9/14/2018    CBC WITH AUTOMATED DIFF       Standing Status:   Future       Standing Expiration Date:   9/14/2018    PROTHROMBIN TIME + INR       Standing Status:   Future       Standing Expiration Date:   9/14/2018    LIPID PANEL       Standing Status:   Future       Standing Expiration Date:   2/13/2019    CARDIAC CATHETERIZATION       Standing Status:   Future       Standing Expiration Date:   2/14/2019       Order Specific Question:   Reason for Exam:       Answer:   systolic chf         Follow-up Disposition:  Return in about 4 weeks (around 9/12/2018).

## 2018-08-21 NOTE — DISCHARGE INSTRUCTIONS
DISCHARGE SUMMARY from Nurse    PATIENT INSTRUCTIONS:    After general anesthesia or intravenous sedation, for 24 hours or while taking prescription Narcotics:  · Limit your activities  · Do not drive and operate hazardous machinery  · Do not make important personal or business decisions  · Do  not drink alcoholic beverages  · If you have not urinated within 8 hours after discharge, please contact your surgeon on call. Report the following to your surgeon:  · Excessive pain, swelling, redness or odor of or around the surgical area  · Temperature over 100.5  · Nausea and vomiting lasting longer than 4 hours or if unable to take medications  · Any signs of decreased circulation or nerve impairment to extremity: change in color, persistent  numbness, tingling, coldness or increase pain  · Any questions    What to do at Home:  Recommended activity: Activity as tolerated and no driving for today,     If you experience any of the following symptoms fever greater than 100.5, bleeding, swelling, numbness or tingling down your extremities, redness, puss from site, please follow up with emergency services. *  Please give a list of your current medications to your Primary Care Provider. *  Please update this list whenever your medications are discontinued, doses are      changed, or new medications (including over-the-counter products) are added. *  Please carry medication information at all times in case of emergency situations. These are general instructions for a healthy lifestyle:    No smoking/ No tobacco products/ Avoid exposure to second hand smoke  Surgeon General's Warning:  Quitting smoking now greatly reduces serious risk to your health.     Obesity, smoking, and sedentary lifestyle greatly increases your risk for illness    A healthy diet, regular physical exercise & weight monitoring are important for maintaining a healthy lifestyle    You may be retaining fluid if you have a history of heart failure or if you experience any of the following symptoms:  Weight gain of 3 pounds or more overnight or 5 pounds in a week, increased swelling in our hands or feet or shortness of breath while lying flat in bed. Please call your doctor as soon as you notice any of these symptoms; do not wait until your next office visit. Recognize signs and symptoms of STROKE:    F-face looks uneven    A-arms unable to move or move unevenly    S-speech slurred or non-existent    T-time-call 911 as soon as signs and symptoms begin-DO NOT go       Back to bed or wait to see if you get better-TIME IS BRAIN. Warning Signs of HEART ATTACK     Call 911 if you have these symptoms:   Chest discomfort. Most heart attacks involve discomfort in the center of the chest that lasts more than a few minutes, or that goes away and comes back. It can feel like uncomfortable pressure, squeezing, fullness, or pain.  Discomfort in other areas of the upper body. Symptoms can include pain or discomfort in one or both arms, the back, neck, jaw, or stomach.  Shortness of breath with or without chest discomfort.  Other signs may include breaking out in a cold sweat, nausea, or lightheadedness. Don't wait more than five minutes to call 911 - MINUTES MATTER! Fast action can save your life. Calling 911 is almost always the fastest way to get lifesaving treatment. Emergency Medical Services staff can begin treatment when they arrive -- up to an hour sooner than if someone gets to the hospital by car. The discharge information has been reviewed with the patient. The patient verbalized understanding. Discharge medications reviewed with the patient and appropriate educational materials and side effects teaching were provided.   ___________________________________________________________________________________________________________________________________                     Cardiac Catheterization/Angiography Discharge Instructions    *Check the puncture site frequently for swelling or bleeding. If you see any bleeding, lie down and apply pressure over the area with a clean town or washcloth. Notify your doctor for any redness, swelling, drainage or oozing from the puncture site. Notify your doctor for any fever or chills. *If the leg or arm with the puncture becomes cold, numb or painful, call Dr Berger    *Activity should be limited for the next 48 hours. Climb stairs as little as possible and avoid any stooping, bending or strenuous activity for 48 hours. No heavy lifting (anything over 10 pounds) for three days. *Do not drive for 48 hours. *You may resume your usual diet. Drink more fluids than usual.    *Have a responsible person drive you home and stay with you for at least 24 hours after your heart catheterization/angiography. *You may remove the bandage from your Right and Groin in 24 hours. You may shower in 24 hours. No tub baths, hot tubs or swimming for one week. Do not place any lotions, creams, powders, ointments over the puncture site for one week. You may place a clean band-aid over the puncture site each day for 5 days. Change this daily.     Patient armband removed and shredded

## 2018-08-21 NOTE — IP AVS SNAPSHOT
[address] Patient: Jeffrey Samaniego MRN: [id number] About your hospitalization You were admitted on:  August 21, 2018 You last received care in the:  [facility] CATH LAB You were discharged on:  August 21, 2018 Why you were hospitalized Your primary diagnosis was:  Coronary Artery Disease Involving Native Coronary Artery Of Native Heart With Angina Pectoris (Hcc) Your diagnoses also included:  Cardiomyopathy (Hcc), Hyperlipidemia, Type 2 Diabetes Mellitus Without Complication, Without Long-Term Current Use Of Insulin (Hcc) Follow-up Information Follow up With Details Comments Contact Info Nicole Mims MD   [address] [facility] [address] 
[phone] Alfredito Kc MD Follow up today as scheduled if appt already made. If not, call and make appt for followup  [address] [address] [facility] [id number] [phone] Your Scheduled Appointments Friday September 21, 2018 10:00 AM EDT Office Visit with Alfredito Kc MD  
[facility]) [address] [place] [address] [address] [facility] [address]  
[phone] Discharge Orders None A check kevin indicates which time of day the medication should be taken. My Medications CONTINUE taking these medications Instructions Each Dose to Equal  
 Morning Noon Evening Bedtime  
 aspirin 81 mg chewable tablet Your last dose was: Your next dose is: Take 1 Tab by mouth daily. 81 mg  
    
   
   
   
  
 carvedilol 3.125 mg tablet Commonly known as:  Haskel Gunjan Your last dose was: Your next dose is: Take 1 Tab by mouth two (2) times daily (with meals) for 30 days. 3.125 mg  
    
   
   
   
  
 digoxin 0.125 mg tablet Commonly known as:  LANOXIN  
   
 Your last dose was: Your next dose is: Take 1 Tab by mouth daily for 30 days. 0.125 mg  
    
   
   
   
  
 glimepiride 4 mg tablet Commonly known as:  AMARYL Your last dose was: Your next dose is: Take  by mouth every morning. * LASIX 20 mg tablet Generic drug:  furosemide Your last dose was: Your next dose is: Take  by mouth daily. * furosemide 40 mg tablet Commonly known as:  LASIX Your last dose was: Your next dose is: Take 1 Tab by mouth daily for 30 days. 40 mg  
    
   
   
   
  
 lisinopril 5 mg tablet Commonly known as:  Marge Mary Kay Your last dose was: Your next dose is: Take 1 Tab by mouth daily for 30 days. 5 mg  
    
   
   
   
  
 metaxalone 800 mg tablet Commonly known as:  SKELAXIN Your last dose was: Your next dose is: Take 1 Tab by mouth every six (6) hours as needed for Pain (muscle spasm). 800 mg  
    
   
   
   
  
 metFORMIN 1,000 mg tablet Commonly known as:  GLUCOPHAGE Your last dose was: Your next dose is: Take 1,000 mg by mouth two (2) times daily (with meals). 1000 mg  
    
   
   
   
  
 simvastatin 20 mg tablet Commonly known as:  ZOCOR Your last dose was: Your next dose is: Take  by mouth nightly. spironolactone 25 mg tablet Commonly known as:  ALDACTONE Your last dose was: Your next dose is: Take 1 Tab by mouth daily for 30 days. 25 mg  
    
   
   
   
  
 VITAMIN D2 50,000 unit capsule Generic drug:  ergocalciferol Your last dose was: Your next dose is: Take 50,000 Units by mouth. 33566 Units * Notice:   This list has 2 medication(s) that are the same as other medications prescribed for you. Read the directions carefully, and ask your doctor or other care provider to review them with you. Discharge Instructions DISCHARGE SUMMARY from Nurse PATIENT INSTRUCTIONS: 
 
 
F-face looks uneven A-arms unable to move or move unevenly S-speech slurred or non-existent T-time-call 911 as soon as signs and symptoms begin-DO NOT go Back to bed or wait to see if you get better-TIME IS BRAIN. Warning Signs of HEART ATTACK Call 911 if you have these symptoms: 
? Chest discomfort. Most heart attacks involve discomfort in the center of the chest that lasts more than a few minutes, or that goes away and comes back. It can feel like uncomfortable pressure, squeezing, fullness, or pain. ? Discomfort in other areas of the upper body. Symptoms can include pain or discomfort in one or both arms, the back, neck, jaw, or stomach. ? Shortness of breath with or without chest discomfort. ? Other signs may include breaking out in a cold sweat, nausea, or lightheadedness. Don't wait more than five minutes to call [address]! Fast action can save your life. Calling 911 is almost always the fastest way to get lifesaving treatment. Emergency Medical Services staff can begin treatment when they arrive  up to an hour sooner than if someone gets to the hospital by car. The discharge information has been reviewed with the patient. The patient verbalized understanding. Discharge medications reviewed with the patient and appropriate educational materials and side effects teaching were provided. ___________________________________________________________________________________________________________________________________ Cardiac Catheterization/Angiography Discharge Instructions *Check the puncture site frequently for swelling or bleeding. If you see any bleeding, lie down and apply pressure over the area with a clean town or washcloth. Notify your doctor for any redness, swelling, drainage or oozing from the puncture site. Notify your doctor for any fever or chills. *If the leg or arm with the puncture becomes cold, numb or painful, call Dr Kika Varner *Activity should be limited for the next 48 hours. Climb stairs as little as possible and avoid any stooping, bending or strenuous activity for 48 hours. No heavy lifting (anything over 10 pounds) for three days. *Do not drive for 48 hours. *You may resume your usual diet. Drink more fluids than usual. 
 
*Have a responsible person drive you home and stay with you for at least 24 hours after your heart catheterization/angiography. *You may remove the bandage from your Right and Groin in 24 hours. You may shower in 24 hours. No tub baths, hot tubs or swimming for one week. Do not place any lotions, creams, powders, ointments over the puncture site for one week. You may place a clean band-aid over the puncture site each day for 5 days. Change this daily. Patient armband removed and shredded Introducing [facility] & [facility]! [facility] introduces [employer] patient portal. Now you can access parts of your medical record, email your doctor's office, and request medication refills online. 1. In your internet browser, go to [email or web address] 2. Click on the First Time User? Click Here link in the Sign In box. You will see the New Member Sign Up page. 3. Enter your [employer] Access Code exactly as it appears below.  You will not need to use this code after youve completed the sign-up process. If you do not sign up before the expiration date, you must request a new code. · [employer] Access Code: [email or web address] Expires: 11/19/2018  4:05 PM 
 
4. Enter the last four digits of your Social Security Number (xxxx) and Date of Birth (mm/dd/yyyy) as indicated and click Submit. You will be taken to the next sign-up page. 5. Create a [employer] ID. This will be your [employer] login ID and cannot be changed, so think of one that is secure and easy to remember. 6. Create a [employer] password. You can change your password at any time. 7. Enter your Password Reset Question and Answer. This can be used at a later time if you forget your password. 8. Enter your e-mail address. You will receive e-mail notification when new information is available in [address] 9. Click Sign Up. You can now view and download portions of your medical record. 10. Click the Download Summary menu link to download a portable copy of your medical information. If you have questions, please visit the Frequently Asked Questions section of the [employer] website. Remember, [employer] is NOT to be used for urgent needs. For medical emergencies, dial 911. Now available from your iPhone and Android! Introducing Armin Duckworth As a [facility] patient, I wanted to make you aware of our electronic visit tool called Armin Duckworth. [facility] 24/7 allows you to connect within minutes with a medical provider 24 hours a day, seven days a week via a mobile device or tablet or logging into a secure website from your computer. You can access Armin Peeleonidas from anywhere in the [place].  
 
A virtual visit might be right for you when you have a simple condition and feel like you just dont want to get out of bed, or cant get away from work for an appointment, when your regular [facility] provider is not available (evenings, weekends or holidays), or when youre out of town and need minor care. Electronic visits cost only $49 and if the [employer] 24/7 provider determines a prescription is needed to treat your condition, one can be electronically transmitted to a nearby pharmacy*. Please take a moment to enroll today if you have not already done so. The enrollment process is free and takes just a few minutes. To enroll, please download the [employer] ally to your tablet or phone, or visit [email or web address] to enroll on your computer. And, as an [address] [place] [address] patient with a [employer] account, the results of your visits will be scanned into your electronic medical record and your primary care provider will be able to view the scanned results. We urge you to continue to see your regular [facility] provider for your ongoing medical care. And while your primary care provider may not be the one available when you seek a Armin [facility]imonggu [employer] virtual visit, the peace of mind you get from getting a real diagnosis real time can be priceless. For more information on Armin Neimonggu [employer], view our Frequently Asked Questions (FAQs) at [email or web address]. Sincerely, 
 
Desiree Ellis MD 
Chief Medical Officer [address] [place] [address] *:  certain medications cannot be prescribed via [employer] Providers Seen During Your Hospitalization Provider Specialty Primary office phone Aidan Campos MD Cardiology [phone] Your Primary Care Physician (PCP) Primary Care Physician Office Phone Office Fax TER[facility]ERANDY [address] [place] [address] [phone] You are allergic to the following No active allergies Recent Documentation Height Weight BMI Smoking Status 1.753 m 89.8 kg 29.24 kg/m2 Former Smoker Emergency Contacts Name Discharge Info Relation Home Work Mobile [address] CAREGIVER [3] Friend [5] [phone] Grecia Tomas  Sister [23] [phone] Patient Belongings The following personal items are in your possession at time of discharge: 
  Dental Appliances: None  Visual Aid: None Please provide this summary of care documentation to your next provider. Signatures-by signing, you are acknowledging that this After Visit Summary has been reviewed with you and you have received a copy. Patient Signature:  ____________________________________________________________ Date:  ____________________________________________________________  
  
[facility] Provider Signature:  ____________________________________________________________ Date:  ____________________________________________________________

## 2018-08-29 ENCOUNTER — APPOINTMENT (OUTPATIENT)
Dept: GENERAL RADIOLOGY | Age: 63
End: 2018-08-29
Attending: EMERGENCY MEDICINE
Payer: COMMERCIAL

## 2018-08-29 ENCOUNTER — HOSPITAL ENCOUNTER (EMERGENCY)
Age: 63
Discharge: HOME | End: 2018-08-29
Attending: EMERGENCY MEDICINE
Payer: COMMERCIAL

## 2018-08-29 ENCOUNTER — APPOINTMENT (OUTPATIENT)
Dept: CT IMAGING | Age: 63
End: 2018-08-29
Attending: EMERGENCY MEDICINE
Payer: COMMERCIAL

## 2018-08-29 VITALS
DIASTOLIC BLOOD PRESSURE: 77 MMHG | HEART RATE: 95 BPM | SYSTOLIC BLOOD PRESSURE: 106 MMHG | OXYGEN SATURATION: 100 % | TEMPERATURE: 98 F | WEIGHT: 183 LBS | BODY MASS INDEX: 27.02 KG/M2 | RESPIRATION RATE: 16 BRPM

## 2018-08-29 DIAGNOSIS — K85.00 IDIOPATHIC ACUTE PANCREATITIS WITHOUT INFECTION OR NECROSIS: Primary | ICD-10-CM

## 2018-08-29 LAB
ALBUMIN SERPL-MCNC: 3.4 G/DL (ref 3.4–5)
ALBUMIN/GLOB SERPL: 0.7 {RATIO} (ref 0.8–1.7)
ALP SERPL-CCNC: 37 U/L (ref 45–117)
ALT SERPL-CCNC: 21 U/L (ref 16–61)
ANION GAP SERPL CALC-SCNC: 5 MMOL/L (ref 3–18)
APPEARANCE UR: CLEAR
AST SERPL-CCNC: 12 U/L (ref 15–37)
BASOPHILS # BLD: 0.1 K/UL (ref 0–0.1)
BASOPHILS NFR BLD: 1 % (ref 0–2)
BILIRUB SERPL-MCNC: 0.4 MG/DL (ref 0.2–1)
BILIRUB UR QL: NEGATIVE
BUN SERPL-MCNC: 20 MG/DL (ref 7–18)
BUN/CREAT SERPL: 17 (ref 12–20)
CALCIUM SERPL-MCNC: 9.4 MG/DL (ref 8.5–10.1)
CHLORIDE SERPL-SCNC: 100 MMOL/L (ref 100–108)
CO2 SERPL-SCNC: 30 MMOL/L (ref 21–32)
COLOR UR: YELLOW
CREAT SERPL-MCNC: 1.16 MG/DL (ref 0.6–1.3)
DIFFERENTIAL METHOD BLD: ABNORMAL
EOSINOPHIL # BLD: 0.1 K/UL (ref 0–0.4)
EOSINOPHIL NFR BLD: 3 % (ref 0–5)
ERYTHROCYTE [DISTWIDTH] IN BLOOD BY AUTOMATED COUNT: 11.8 % (ref 11.6–14.5)
GLOBULIN SER CALC-MCNC: 4.8 G/DL (ref 2–4)
GLUCOSE SERPL-MCNC: 140 MG/DL (ref 74–99)
GLUCOSE UR STRIP.AUTO-MCNC: NEGATIVE MG/DL
HCT VFR BLD AUTO: 43.9 % (ref 36–48)
HGB BLD-MCNC: 15.8 G/DL (ref 13–16)
HGB UR QL STRIP: NEGATIVE
KETONES UR QL STRIP.AUTO: 15 MG/DL
LACTATE BLD-SCNC: 1 MMOL/L (ref 0.4–2)
LEUKOCYTE ESTERASE UR QL STRIP.AUTO: NEGATIVE
LIPASE SERPL-CCNC: 880 U/L (ref 73–393)
LYMPHOCYTES # BLD: 1.9 K/UL (ref 0.9–3.6)
LYMPHOCYTES NFR BLD: 43 % (ref 21–52)
MCH RBC QN AUTO: 30.1 PG (ref 24–34)
MCHC RBC AUTO-ENTMCNC: 36 G/DL (ref 31–37)
MCV RBC AUTO: 83.6 FL (ref 74–97)
MONOCYTES # BLD: 0.4 K/UL (ref 0.05–1.2)
MONOCYTES NFR BLD: 10 % (ref 3–10)
MUCOUS THREADS URNS QL MICRO: ABNORMAL /LPF
NEUTS SEG # BLD: 1.9 K/UL (ref 1.8–8)
NEUTS SEG NFR BLD: 43 % (ref 40–73)
NITRITE UR QL STRIP.AUTO: NEGATIVE
PH UR STRIP: 6 [PH] (ref 5–8)
PLATELET # BLD AUTO: 290 K/UL (ref 135–420)
PMV BLD AUTO: 10.6 FL (ref 9.2–11.8)
POTASSIUM SERPL-SCNC: 4.3 MMOL/L (ref 3.5–5.5)
PROT SERPL-MCNC: 8.2 G/DL (ref 6.4–8.2)
PROT UR STRIP-MCNC: 30 MG/DL
RBC # BLD AUTO: 5.25 M/UL (ref 4.7–5.5)
RBC #/AREA URNS HPF: ABNORMAL /HPF (ref 0–5)
SODIUM SERPL-SCNC: 135 MMOL/L (ref 136–145)
SP GR UR REFRACTOMETRY: 1.03 (ref 1–1.03)
UROBILINOGEN UR QL STRIP.AUTO: 1 EU/DL (ref 0.2–1)
WBC # BLD AUTO: 4.4 K/UL (ref 4.6–13.2)
WBC URNS QL MICRO: ABNORMAL /HPF (ref 0–5)

## 2018-08-29 PROCEDURE — 74011250636 HC RX REV CODE- 250/636: Performed by: EMERGENCY MEDICINE

## 2018-08-29 PROCEDURE — 74022 RADEX COMPL AQT ABD SERIES: CPT

## 2018-08-29 PROCEDURE — 96361 HYDRATE IV INFUSION ADD-ON: CPT

## 2018-08-29 PROCEDURE — 74177 CT ABD & PELVIS W/CONTRAST: CPT

## 2018-08-29 PROCEDURE — 81001 URINALYSIS AUTO W/SCOPE: CPT | Performed by: EMERGENCY MEDICINE

## 2018-08-29 PROCEDURE — 83690 ASSAY OF LIPASE: CPT | Performed by: EMERGENCY MEDICINE

## 2018-08-29 PROCEDURE — 74011636320 HC RX REV CODE- 636/320: Performed by: EMERGENCY MEDICINE

## 2018-08-29 PROCEDURE — 96374 THER/PROPH/DIAG INJ IV PUSH: CPT

## 2018-08-29 PROCEDURE — 99284 EMERGENCY DEPT VISIT MOD MDM: CPT

## 2018-08-29 PROCEDURE — 85025 COMPLETE CBC W/AUTO DIFF WBC: CPT | Performed by: EMERGENCY MEDICINE

## 2018-08-29 PROCEDURE — 83605 ASSAY OF LACTIC ACID: CPT

## 2018-08-29 PROCEDURE — 80053 COMPREHEN METABOLIC PANEL: CPT | Performed by: EMERGENCY MEDICINE

## 2018-08-29 RX ORDER — ONDANSETRON 2 MG/ML
4 INJECTION INTRAMUSCULAR; INTRAVENOUS
Status: COMPLETED | OUTPATIENT
Start: 2018-08-29 | End: 2018-08-29

## 2018-08-29 RX ORDER — ONDANSETRON 4 MG/1
4 TABLET, ORALLY DISINTEGRATING ORAL
Qty: 12 TAB | Refills: 0 | Status: SHIPPED | OUTPATIENT
Start: 2018-08-29 | End: 2018-11-16

## 2018-08-29 RX ORDER — OXYCODONE AND ACETAMINOPHEN 5; 325 MG/1; MG/1
1 TABLET ORAL
Qty: 12 TAB | Refills: 0 | Status: SHIPPED | OUTPATIENT
Start: 2018-08-29 | End: 2022-06-08 | Stop reason: ALTCHOICE

## 2018-08-29 RX ADMIN — ONDANSETRON 4 MG: 2 INJECTION, SOLUTION INTRAMUSCULAR; INTRAVENOUS at 13:13

## 2018-08-29 RX ADMIN — IOPAMIDOL 96 ML: 612 INJECTION, SOLUTION INTRAVENOUS at 15:31

## 2018-08-29 RX ADMIN — SODIUM CHLORIDE 500 ML: 900 INJECTION, SOLUTION INTRAVENOUS at 12:23

## 2018-08-29 NOTE — ED PROVIDER NOTES
EMERGENCY DEPARTMENT HISTORY AND PHYSICAL EXAM 
 
11:21 AM 
 
 
Date: 8/29/2018 Patient Name: Sherren Abrahams History of Presenting Illness Chief Complaint Patient presents with  Abdominal Pain  Vomiting History Provided By: Patient Chief Complaint: abd pain Duration: last week Timing:  Intermittent Location: periumbilical abd Quality: saliva Severity: Mild Modifying Factors: Pt took maylox with relief of his constipation Associated Symptoms: vomiting andconstipation (4 days). Denies fever and chills. Additional History (Context): Sherren Abrahams is a 60 y.o. male with PMHx significant for arthritis, HLD, DM, cardiomyopathy who presents with CC of intermittent mild periumbilical abd pain that started 1 week ago . Associated sx are vomiting and constipation (4 days). Denies fever and chills. No recent abd surgeries. Pt took maylox with relief of his constipation. Pt is a former smoker and drinker. PCP: Eleni Everett MD 
 
Current Outpatient Prescriptions Medication Sig Dispense Refill  oxyCODONE-acetaminophen (PERCOCET) 5-325 mg per tablet Take 1 Tab by mouth every four (4) hours as needed for Pain. Max Daily Amount: 6 Tabs. 12 Tab 0  
 ondansetron (ZOFRAN ODT) 4 mg disintegrating tablet Take 1 Tab by mouth every eight (8) hours as needed for Nausea. 12 Tab 0  
 ergocalciferol (VITAMIN D2) 50,000 unit capsule Take 50,000 Units by mouth.  glimepiride (AMARYL) 4 mg tablet Take  by mouth every morning.  metaxalone (SKELAXIN) 800 mg tablet Take 1 Tab by mouth every six (6) hours as needed for Pain (muscle spasm). 20 Tab 0  carvedilol (COREG) 3.125 mg tablet Take 1 Tab by mouth two (2) times daily (with meals) for 30 days. 60 Tab 0  
 digoxin (LANOXIN) 0.125 mg tablet Take 1 Tab by mouth daily for 30 days.  (Patient taking differently: Take 125 mcg by mouth daily.) 30 Tab 0  
  lisinopril (PRINIVIL, ZESTRIL) 5 mg tablet Take 1 Tab by mouth daily for 30 days. 30 Tab 0  
 furosemide (LASIX) 40 mg tablet Take 1 Tab by mouth daily for 30 days. 30 Tab 0  
 spironolactone (ALDACTONE) 25 mg tablet Take 1 Tab by mouth daily for 30 days. 30 Tab 0  
 furosemide (LASIX) 20 mg tablet Take  by mouth daily.  aspirin 81 mg chewable tablet Take 1 Tab by mouth daily. 30 Tab 0  
 metFORMIN (GLUCOPHAGE) 1,000 mg tablet Take 1,000 mg by mouth two (2) times daily (with meals).  simvastatin (ZOCOR) 20 mg tablet Take  by mouth nightly. Past History Past Medical History: 
Past Medical History:  
Diagnosis Date  Arthritis  Cardiomyopathy ([facility] [address] 7/13/2018 EF reported 45-50% in 2010  Chest pain 7/13/2018 Will anginal pain. Less likely GERD chest wall  Hyperlipidemia 7/13/2018 On statin. Lab with PCP  Nonspecific abnormal electrocardiogram (ECG) (EKG)  Obesity, unspecified  Other and unspecified hyperlipidemia  Other primary cardiomyopathies  Shortness of breath  SOB (shortness of breath) 7/13/2018 Possible CHF, HCVD, angina equivalent.  Type 2 diabetes mellitus without complication, without long-term current use of insulin ([facility] [address] 7/13/2018 On metformin. Lab with PCP  Type II or unspecified type diabetes mellitus without mention of complication, not stated as uncontrolled Past Surgical History: 
History reviewed. No pertinent surgical history. Family History: 
Family History Problem Relation Age of Onset  Heart Disease Mother  No Known Problems Father  Heart Disease Brother   
  enlarged heart Social History: 
Social History Substance Use Topics  Smoking status: Former Smoker Quit date: 12/6/1998  Smokeless tobacco: Never Used  Alcohol use No  
   Comment: former heavy drinker Allergies: 
No Known Allergies Review of Systems Review of Systems Constitutional: Negative for activity change, chills and fever. HENT: Negative for congestion, ear pain, sore throat and trouble swallowing. Eyes: Negative for visual disturbance. Respiratory: Negative for cough, shortness of breath and wheezing. Cardiovascular: Negative for chest pain, palpitations and leg swelling. Gastrointestinal: Positive for abdominal pain, constipation and vomiting. Negative for diarrhea and nausea. Genitourinary: Negative for decreased urine volume, dysuria, frequency and urgency. Musculoskeletal: Negative for arthralgias and joint swelling. Skin: Negative for rash. Neurological: Negative for weakness, numbness and headaches. Psychiatric/Behavioral: Negative for agitation and confusion. All other systems reviewed and are negative. Physical Exam  
 
Visit Vitals  /77  Pulse 95  Temp 98 °F (36.7 °C)  Resp 16  Wt 83 kg (183 lb)  SpO2 100%  BMI 27.02 kg/m2 Physical Exam  
Constitutional: He is oriented to person, place, and time. He appears well-developed and well-nourished. He is cooperative. No distress. HENT:  
Head: Normocephalic and atraumatic. Mouth/Throat: Oropharynx is clear and moist. No oropharyngeal exudate. Eyes: Conjunctivae and EOM are normal. Right eye exhibits no discharge. Left eye exhibits no discharge. No scleral icterus. Neck: Normal range of motion and phonation normal. Neck supple. No JVD present. No thyromegaly present. Cardiovascular: Normal rate, regular rhythm, S1 normal, S2 normal, normal heart sounds and intact distal pulses. Exam reveals no gallop and no friction rub. No murmur heard. Pulmonary/Chest: Effort normal and breath sounds normal. No accessory muscle usage. No respiratory distress. He has no wheezes. He has no rhonchi. He has no rales. Abdominal: Soft. Normal appearance and bowel sounds are normal. He exhibits no distension and no pulsatile midline mass. There is tenderness. There is no rebound and no guarding. Abd is soft with diffusely moderate TTP Hyperactive bowel sounds Musculoskeletal: Normal range of motion. He exhibits no edema or deformity. Lymphadenopathy:  
     Head (right side): No submandibular adenopathy present. He has no cervical adenopathy. Neurological: He is alert and oriented to person, place, and time. Moves all extremities. No obvious focal deficits or facial asymmetry. Skin: Skin is warm and dry. No rash noted. Psychiatric: He has a normal mood and affect. His speech is normal and behavior is normal.  
Nursing note and vitals reviewed. Diagnostic Study Results Labs - Recent Results (from the past 12 hour(s)) CBC WITH AUTOMATED DIFF Collection Time: 08/29/18 10:50 AM  
Result Value Ref Range WBC 4.4 (L) 4.6 - 13.2 K/uL  
 RBC 5.25 4.70 - 5.50 M/uL  
 HGB 15.8 13.0 - 16.0 g/dL HCT 43.9 36.0 - 48.0 % MCV 83.6 74.0 - 97.0 FL  
 MCH 30.1 24.0 - 34.0 PG  
 MCHC 36.0 31.0 - 37.0 g/dL  
 RDW 11.8 11.6 - 14.5 % PLATELET 149 224 - 538 K/uL MPV 10.6 9.2 - 11.8 FL  
 NEUTROPHILS 43 40 - 73 % LYMPHOCYTES 43 21 - 52 % MONOCYTES 10 3 - 10 % EOSINOPHILS 3 0 - 5 % BASOPHILS 1 0 - 2 %  
 ABS. NEUTROPHILS 1.9 1.8 - 8.0 K/UL  
 ABS. LYMPHOCYTES 1.9 0.9 - 3.6 K/UL  
 ABS. MONOCYTES 0.4 0.05 - 1.2 K/UL  
 ABS. EOSINOPHILS 0.1 0.0 - 0.4 K/UL  
 ABS. BASOPHILS 0.1 0.0 - 0.1 K/UL  
 DF AUTOMATED METABOLIC PANEL, COMPREHENSIVE Collection Time: 08/29/18 10:50 AM  
Result Value Ref Range Sodium 135 (L) 136 - 145 mmol/L Potassium 4.3 3.5 - 5.5 mmol/L Chloride 100 100 - 108 mmol/L  
 CO2 30 21 - 32 mmol/L Anion gap 5 3.0 - 18 mmol/L Glucose 140 (H) 74 - 99 mg/dL BUN 20 (H) 7.0 - 18 MG/DL Creatinine 1.16 0.6 - 1.3 MG/DL  
 BUN/Creatinine ratio 17 12 - 20 GFR est AA >60 >60 ml/min/1.73m2 GFR est non-AA >60 >60 ml/min/1.73m2 Calcium 9.4 8.5 - 10.1 MG/DL  Bilirubin, total 0.4 0.2 - 1.0 MG/DL  
 ALT (SGPT) 21 16 - 61 U/L  
 AST (SGOT) 12 (L) 15 - 37 U/L Alk. phosphatase 37 (L) 45 - 117 U/L Protein, total 8.2 6.4 - 8.2 g/dL Albumin 3.4 3.4 - 5.0 g/dL Globulin 4.8 (H) 2.0 - 4.0 g/dL A-G Ratio 0.7 (L) 0.8 - 1.7 LIPASE Collection Time: 08/29/18 10:50 AM  
Result Value Ref Range Lipase 880 (H) 73 - 393 U/L  
POC LACTIC ACID Collection Time: 08/29/18 12:29 PM  
Result Value Ref Range Lactic Acid (POC) 1.0 0.4 - 2.0 mmol/L  
URINALYSIS W/ RFLX MICROSCOPIC Collection Time: 08/29/18  1:30 PM  
Result Value Ref Range Color YELLOW Appearance CLEAR Specific gravity 1.027 1.005 - 1.030    
 pH (UA) 6.0 5.0 - 8.0 Protein 30 (A) NEG mg/dL Glucose NEGATIVE  NEG mg/dL Ketone 15 (A) NEG mg/dL Bilirubin NEGATIVE  NEG Blood NEGATIVE  NEG Urobilinogen 1.0 0.2 - 1.0 EU/dL Nitrites NEGATIVE  NEG Leukocyte Esterase NEGATIVE  NEG    
URINE MICROSCOPIC ONLY Collection Time: 08/29/18  1:30 PM  
Result Value Ref Range WBC 0 to 1 0 - 5 /hpf  
 RBC 0 to 1 0 - 5 /hpf Mucus 1+ (A) NEG /lpf Radiologic Studies - Xr Abd Acute W 1 V Chest 
 
Result Date: 8/29/2018 EXAMINATION: Abdomen 2 views, chest single view INDICATION: Abdominal pain, constipation COMPARISON: None FINDINGS: Frontal view of the chest obtained. No consolidation. Mediastinal silhouette and pulmonary vasculature unremarkable. No evidence of pneumothorax. No acute osseous findings. Frontal supine and upright views of the abdomen obtained. Nonobstructive bowel gas pattern. No evidence of free air. Probable pelvic phleboliths and a few atherosclerotic calcifications. No acute osseous findings. IMPRESSION: 1. No acute findings. Ct Abd Pelv W Cont Result Date: 8/29/2018 CT ABDOMEN AND PELVIS WITH IV CONTRAST COMPARISON: None INDICATIONS: Abdominal pain with increased lipase TECHNIQUE:  Following the uneventful administration of 96 cc of Isovue-300, dynamic enhanced multislice scanning of the abdomen and pelvis was performed during the portal venous phase using standard 5 mm collimated images. All CT scans at this facility are performed using dose optimization technique as appropriate to a performed exam, to include automated exposure control, adjustment of the mA and/or KV according to patient's size (including appropriate matching for site-specific examinations), or use of iterative reconstruction technique. CT ABDOMEN FINDINGS: Visualized portions of the lung bases, heart and pericardium are normal. There is mild hepatic steatosis. The gallbladder is normal. The spleen is normal. There is suggestion of mild edema involving the head and body of the pancreas. Surrounding peripancreatic fat suggests mild edema. There is no hemorrhage or fluid collection. There is no pseudocyst formation. Adrenal glands are normal. There is a small 7 mm low density focus involving the lateral aspect of the right kidney on image 25 probably representing a cyst. The left kidney is normal. I see no intra-abdominal or retroperitoneal adenopathy. There are mild generalized arterial atherosclerotic changes involving the abdominal aorta without aneurysmal dilatation. CT PELVIS FINDINGS: The prostate gland is normal in size. There is a small amount of free fluid in the pelvis. No bladder abnormalities are seen. There is no pelvic adenopathy. The appendix is normal. No specific bowel abnormalities are seen. Extensive degenerative disc disease and osteoarthritic changes are seen involving the spine. Impression: 1. The head and body of the pancreas appear to be slightly edematous suggesting early or mild pancreatitis. There is no evidence of hemorrhage or pseudocyst formation. 2. There is a small amount of free fluid in the pelvis possibly related to number 1. 3. Mild hepatic steatosis. Medical Decision Making I am the first provider for this patient. I reviewed the vital signs, available nursing notes, past medical history, past surgical history, family history and social history. Vital Signs-Reviewed the patient's vital signs. Pulse Oximetry Analysis -  98% on room air Records Reviewed: Nursing Notes (Time of Review: 11:21 AM) Provider Notes (Medical Decision Making): ASSESSMENT / PLAN: 
       61y/o AAM, PMHx of HTN, DM, CHF (LVEF 15-20%, Ichemic Cardiomyopathy) who presents via POV for about 1wk of intermittent crampy central abd pain w/nausea/vomiting episodes (no blood/bile). No trauma, no fever, no dysuria. No tob/etoh/drug use. No previous abd surgeries. No relation to meals. He was constipated for about 4dys, took some Maalox and had small firm BM yesterday. On exam, normal vitals, sitting up edge of bed, NAD. HEENt w/dense cataract in left eye (old injury as child), no JVD. CV, Lung benign. Abd soft, diffuse central mild abd pain w/out rebound/guarding, somewhat tympanic, hypoactive BS but present, no lesions or pulsatile masses. No LE edema. Unsure of dx at this time. Thinking most likely constipation. Possibly low flow state from HFrEF. DDx includes gastritis, esophagitis, GERD, ulcer, hepatitis, symptomatic cholelithiasis, cholecystitis, diverticulitis, colitis, appendicitis, cystitis, pyelonephritis, malignancy although these seem less likely at this time. No ETOH use, known gallbladder dz or culprit meds to suggest Pancreatitis. No real obstructive symptoms and no previous abd surgeries but possible. No classic signs/symptoms to suggest inflammatory bowel disease (Crohns, Ulcerative Colitis) at this time. DDx also includes STI, urethritis, epidytimitis, orchitis, but no classic symptoms at this time. Could also be functional abdominal pain/stress/emotion related but will have to r/out the organic causes first.  Abd exam nonsurgical at this time. -Monitor -IV Access 
-Labs: CBC, CMP, UA 
-Lipase 
-Lactate 
-Meds:  Zofran 4mg IV, 1 liter NS IVF bolus 
-Imaging: Acute Abd Series -Re-Assess Josseline Boyce MD 
EM-IM Physician ED Course: Progress Notes, Reevaluation, and Consults: 
UPDATE 4:58 PM 
-CBC, CMP, UA benign 
-Lipase elevated at 880.  
-I did CT Abd/Pelvis that just shows mild inflammation of pancreas w/out mass or cyst. 
 
-Unsure of cause. Pt denies ETOH, LFTs not suggestive of biliary dz and no known hx of this. His only culprit meds are Lasix and Spironolactone. He was taking lasix once daily but last Thursday/friday he took twice per day at direction of his cardiologist. Sympotms started the next day or two. Unsure, but this may have been cause. Regardless, he is tolderating some PO, pain is not unbearable at this time. 
 
-No med changes 
-DC home 
-Rx Percocet #12 and Zofran #12 
-Encouraged very bland diet, no fatty/greasy foods for next few days and re-assess 
-If worsens, should come back 
-Pt and sister (at bedside) comfortable w/plan  
 
Juani Malik MD 
EM-IM Physician Diagnosis Clinical Impression: 1. Idiopathic acute pancreatitis without infection or necrosis Disposition: Home Follow-up Information Follow up With Details Comments Contact Info Abbey Tian MD Call in 1 day  [address] [place] [address] [phone] [facility] EMERGENCY DEPT  As needed, If symptoms worsen [address] [place] [address] [address] Patient's Medications Start Taking ONDANSETRON (ZOFRAN ODT) 4 MG DISINTEGRATING TABLET    Take 1 Tab by mouth every eight (8) hours as needed for Nausea. OXYCODONE-ACETAMINOPHEN (PERCOCET) 5-325 MG PER TABLET    Take 1 Tab by mouth every four (4) hours as needed for Pain. Max Daily Amount: 6 Tabs. Continue Taking ASPIRIN 81 MG CHEWABLE TABLET    Take 1 Tab by mouth daily. CARVEDILOL (COREG) 3.125 MG TABLET    Take 1 Tab by mouth two (2) times daily (with meals) for 30 days. DIGOXIN (LANOXIN) 0.125 MG TABLET    Take 1 Tab by mouth daily for 30 days. ERGOCALCIFEROL (VITAMIN D2) 50,000 UNIT CAPSULE    Take 50,000 Units by mouth. FUROSEMIDE (LASIX) 20 MG TABLET    Take  by mouth daily. FUROSEMIDE (LASIX) 40 MG TABLET    Take 1 Tab by mouth daily for 30 days. GLIMEPIRIDE (AMARYL) 4 MG TABLET    Take  by mouth every morning. LISINOPRIL (PRINIVIL, ZESTRIL) 5 MG TABLET    Take 1 Tab by mouth daily for 30 days. METAXALONE (SKELAXIN) 800 MG TABLET    Take 1 Tab by mouth every six (6) hours as needed for Pain (muscle spasm). METFORMIN (GLUCOPHAGE) 1,000 MG TABLET    Take 1,000 mg by mouth two (2) times daily (with meals). SIMVASTATIN (ZOCOR) 20 MG TABLET    Take  by mouth nightly. SPIRONOLACTONE (ALDACTONE) 25 MG TABLET    Take 1 Tab by mouth daily for 30 days. These Medications have changed No medications on file Stop Taking No medications on file  
 
_______________________________ Attestations: 
Scribe Attestation Teo Rueda acting as a scribe for and in the presence of Stephanie Baptiste MD     
August 29, 2018 at 11:21 AM 
    
Provider Attestation:     
I personally performed the services described in the documentation, reviewed the documentation, as recorded by the scribe in my presence, and it accurately and completely records my words and actions. August 29, 2018 at 11:21 AM - Stephanie Baptiste MD   
_______________________________

## 2018-08-29 NOTE — DISCHARGE INSTRUCTIONS
Pancreatitis: Care Instructions  Your Care Instructions    The pancreas is an organ behind the stomach. It makes hormones and enzymes to help your body digest food. But if these enzymes attack the pancreas, it can get inflamed. This is called pancreatitis. Most cases are caused by gallstones or by heavy alcohol use. If you take care of yourself at home, it will help you get better. It will also help you avoid more problems with your pancreas. Follow-up care is a key part of your treatment and safety. Be sure to make and go to all appointments, and call your doctor if you are having problems. It's also a good idea to know your test results and keep a list of the medicines you take. How can you care for yourself at home? · Drink clear liquids and eat bland foods until you feel better. Waller foods include rice, dry toast, and crackers. They also include bananas and applesauce. · Eat a low-fat diet until your doctor says your pancreas is healed. · Do not drink alcohol. Tell your doctor if you need help to quit. Counseling, support groups, and sometimes medicines can help you stay sober. · Be safe with medicines. Read and follow all instructions on the label. ¨ If the doctor gave you a prescription medicine for pain, take it as prescribed. ¨ If you are not taking a prescription pain medicine, ask your doctor if you can take an over-the-counter medicine. · If your doctor prescribed antibiotics, take them as directed. Do not stop taking them just because you feel better. You need to take the full course of antibiotics. · Get extra rest until you feel better. To prevent future problems with your pancreas  · Do not drink alcohol. · Tell your doctors and pharmacist that you've had pancreatitis. They can help you avoid medicines that may cause this problem again. When should you call for help? Call 911 anytime you think you may need emergency care.  For example, call if:    · You vomit blood or what looks like coffee grounds.     · Your stools are maroon or very bloody.    Call your doctor now or seek immediate medical care if:    · You have new or worse belly pain.     · Your stools are black and look like tar, or they have streaks of blood.     · You are vomiting.    Watch closely for changes in your health, and be sure to contact your doctor if:    · You do not get better as expected. Where can you learn more? Go to http://brandt-lore.info/. Enter H297 in the search box to learn more about \"Pancreatitis: Care Instructions. \"  Current as of: May 12, 2017  Content Version: 11.7  © 6697-6373 [employer]. Care instructions adapted under license by [employer] (which disclaims liability or warranty for this information). If you have questions about a medical condition or this instruction, always ask your healthcare professional. [address] any warranty or liability for your use of this information. Learning About Acute Pancreatitis  What is acute pancreatitis? The pancreas is an organ behind the stomach. It makes hormones like insulin that help control how your body uses sugar. It also makes enzymes that help you digest food. Usually these enzymes flow from the pancreas to the intestines. But if they leak into the pancreas, they can irritate it and cause pain and swelling. When this happens suddenly, it's called acute pancreatitis. What causes it? Most of the time, acute pancreatitis is caused by gallstones or by heavy alcohol use. But several other things can cause it, such as:  · High levels of fats in the blood. · An injury. · A problem after a surgery or a procedure. · Certain medicines. What are the symptoms? The main symptom is pain in the upper belly. The pain can be severe. You also may have a fever, nausea, or vomiting. Some people get so sick that they have problems breathing. They also may have low blood pressure.   How is it diagnosed? Your doctor will diagnose pancreatitis with an exam and by looking at your lab tests. Your doctor may think that you have this problem based on your symptoms and where you have pain in your belly. You may have blood tests of enzymes called amylase and lipase. In pancreatitis, the level of these enzymes is usually much higher than normal.  You also may have imaging tests of the belly. These may include an ultrasound, a CT scan, or an MRI. A special MRI called MRCP can show images of the bile ducts. This test can be very helpful when gallstones are causing the problem. How is it treated? Treatment of acute pancreatitis usually takes place in the hospital. It focuses on taking care of pain and supporting your body while your pancreas heals. In severe cases, treatment may occur in an intensive care unit to support breathing, treat serious infections, or help raise very low blood pressure. If a gallstone is causing the problem, the gallstone may need to be removed. This is done during a procedure called ERCP. The doctor puts a scope in your mouth and moves it gently through the stomach and into the ducts from the pancreas and gallbladder. The doctor is then able to see a stone and remove it. Sometimes the gallbladder, which makes gallstones, needs to be removed by surgery. People with pancreatitis often need a lot of fluid to help support their other organs and their blood pressure. They get fluids through a vein (intravenous, or IV). Instead of food by mouth, nutrition is sometimes given through a vein while the pancreas heals. Follow-up care is a key part of your treatment and safety. Be sure to make and go to all appointments, and call your doctor if you are having problems. It's also a good idea to know your test results and keep a list of the medicines you take. Where can you learn more? Go to http://brandt-lore.info/.   Enter S093 in the search box to learn more about \"Learning About Acute Pancreatitis. \"  Current as of: May 12, 2017  Content Version: 11.7  © 0680-2045 [employer]. Care instructions adapted under license by [employer] (which disclaims liability or warranty for this information). If you have questions about a medical condition or this instruction, always ask your healthcare professional. [place] [address] any warranty or liability for your use of this information.

## 2018-09-12 DIAGNOSIS — E78.5 HYPERLIPIDEMIA, UNSPECIFIED HYPERLIPIDEMIA TYPE: ICD-10-CM

## 2018-09-21 ENCOUNTER — OFFICE VISIT (OUTPATIENT)
Dept: CARDIOLOGY CLINIC | Age: 63
End: 2018-09-21

## 2018-09-21 VITALS
BODY MASS INDEX: 29.47 KG/M2 | HEART RATE: 93 BPM | WEIGHT: 199 LBS | DIASTOLIC BLOOD PRESSURE: 66 MMHG | HEIGHT: 69 IN | SYSTOLIC BLOOD PRESSURE: 101 MMHG

## 2018-09-21 DIAGNOSIS — E78.5 HYPERLIPIDEMIA, UNSPECIFIED HYPERLIPIDEMIA TYPE: ICD-10-CM

## 2018-09-21 DIAGNOSIS — I50.22 SYSTOLIC CHF, CHRONIC (HCC): ICD-10-CM

## 2018-09-21 DIAGNOSIS — I42.9 CARDIOMYOPATHY, UNSPECIFIED TYPE (HCC): Primary | ICD-10-CM

## 2018-09-21 DIAGNOSIS — E11.9 TYPE 2 DIABETES MELLITUS WITHOUT COMPLICATION, WITHOUT LONG-TERM CURRENT USE OF INSULIN (HCC): ICD-10-CM

## 2018-09-21 RX ORDER — SPIRONOLACTONE 25 MG/1
TABLET ORAL DAILY
COMMUNITY
End: 2018-11-16 | Stop reason: SDUPTHER

## 2018-09-21 RX ORDER — DIGOXIN 125 MCG
TABLET ORAL DAILY
COMMUNITY
End: 2018-11-16 | Stop reason: SDUPTHER

## 2018-09-21 RX ORDER — SIMVASTATIN 40 MG/1
TABLET, FILM COATED ORAL
COMMUNITY
End: 2018-11-16 | Stop reason: SDUPTHER

## 2018-09-21 RX ORDER — CARVEDILOL 3.12 MG/1
TABLET ORAL 2 TIMES DAILY WITH MEALS
COMMUNITY
End: 2018-11-16 | Stop reason: SDUPTHER

## 2018-09-21 RX ORDER — LISINOPRIL 5 MG/1
TABLET ORAL DAILY
COMMUNITY
End: 2018-11-16 | Stop reason: SDUPTHER

## 2018-09-21 NOTE — MR AVS SNAPSHOT
[facility] 
 
 
 [address] [facility], [address] [facility] [address] 
[phone] Patient: Maida Patten MRN: [id number] BQL:[id number] Visit Information Date & Time Provider Department Dept. Phone Encounter #  
 9/21/2018 10:00 AM Leonardo Martines MD [facility] [address] [place] [address] [id number] Follow-up Instructions Return in about 6 weeks (around 11/2/2018). Your Appointments 10/26/2018 12:45 PM  
PROCEDURE with [place] ECHO [facility] [place] ([address]) Appt Note: [address], [address] [facility] [address]  
[phone]  
  
   
 [address] [facility], [address] [place] [address] Road [address]  
  
    
 11/16/2018  9:30 AM  
Office Visit with Leonardo Martines MD  
[facility] Appt Note: post echo [address] [facility], [address] [facility] [address]  
[address], [address] [place] [address] Upcoming Health Maintenance Date Due Hepatitis C Screening 1955 HEMOGLOBIN A1C Q6M 1955 FOOT EXAM Q1 11/18/1964 MICROALBUMIN Q1 11/18/1964 EYE EXAM RETINAL OR DILATED Q1 11/18/1964 Pneumococcal 19-64 Medium Risk (1 of 1 - PPSV23) 11/18/1973 DTaP/Tdap/Td series (1 - Tdap) 11/18/1975 FOBT Q 1 YEAR AGE 50-75 11/18/2004 ZOSTER VACCINE AGE 60> 9/18/2014 Influenza Age 5 to Adult 8/1/2018 LIPID PANEL Q1 8/15/2019 Allergies as of 9/21/2018  Review Complete On: 9/21/2018 By: Leonardo Martines MD  
 No Known Allergies Current Immunizations  Never Reviewed No immunizations on file. Not reviewed this visit You Were Diagnosed With   
  
 Codes Comments Cardiomyopathy, unspecified type ([facility] [address]    -  Primary ICD-10-CM: I42.9 ICD-9-CM: 425.4 Systolic CHF, chronic (HCC)     ICD-10-CM: I50.22 ICD-9-CM: 428.22, 428.0 class2-3 Hyperlipidemia, unspecified hyperlipidemia type     ICD-10-CM: E78.5 ICD-9-CM: 272.4 Type 2 diabetes mellitus without complication, without long-term current use of insulin (HCC)     ICD-10-CM: E11.9 ICD-9-CM: 250.00 Vitals BP Pulse Height(growth percentile) Weight(growth percentile) BMI Smoking Status 101/66 93 5' 9\" (1.753 m) 199 lb (90.3 kg) 29.39 kg/m2 Former Smoker Vitals History BMI and BSA Data Body Mass Index Body Surface Area  
 29.39 kg/m 2 2.1 m 2 Preferred Pharmacy Pharmacy Name Phone [id number], [address] [address] [facility] Your Updated Medication List  
  
   
This list is accurate as of 9/21/18 10:23 AM.  Always use your most recent med list.  
  
  
  
  
 aspirin 81 mg chewable tablet Take 1 Tab by mouth daily. carvedilol 3.125 mg tablet Commonly known as:  Mar Pinnacle Take  by mouth two (2) times daily (with meals). digoxin 0.125 mg tablet Commonly known as:  LANOXIN Take  by mouth daily. glimepiride 4 mg tablet Commonly known as:  AMARYL Take  by mouth every morning. LASIX 20 mg tablet Generic drug:  furosemide Take 40 mg by mouth daily. lisinopril 5 mg tablet Commonly known as:  Brigitte Lonny Take  by mouth daily. metaxalone 800 mg tablet Commonly known as:  SKELAXIN Take 1 Tab by mouth every six (6) hours as needed for Pain (muscle spasm). metFORMIN 1,000 mg tablet Commonly known as:  GLUCOPHAGE Take 1,000 mg by mouth two (2) times daily (with meals). ondansetron 4 mg disintegrating tablet Commonly known as:  ZOFRAN ODT Take 1 Tab by mouth every eight (8) hours as needed for Nausea. oxyCODONE-acetaminophen 5-325 mg per tablet Commonly known as:  PERCOCET Take 1 Tab by mouth every four (4) hours as needed for Pain. Max Daily Amount: 6 Tabs. simvastatin 40 mg tablet Commonly known as:  ZOCOR Take  by mouth nightly. spironolactone 25 mg tablet Commonly known as:  ALDACTONE  
 Take  by mouth daily. VITAMIN D2 50,000 unit capsule Generic drug:  ergocalciferol Take 50,000 Units by mouth. Follow-up Instructions Return in about 6 weeks (around 11/2/2018). To-Do List   
 09/21/2018 Echocardiography:  ECHO ADULT COMPLETE Introducing [facility]! [facility] introduces [employer] patient portal. Now you can access parts of your medical record, email your doctor's office, and request medication refills online. 1. In your internet browser, go to [email or web address]://[email or web address]. [employer] 2. Click on the First Time User? Click Here link in the Sign In box. You will see the New Member Sign Up page. 3. Enter your [employer] Access Code exactly as it appears below. You will not need to use this code after youve completed the sign-up process. If you do not sign up before the expiration date, you must request a new code. · [employer] Access Code: [email or web address] Expires: 11/19/2018  4:05 PM 
 
4. Enter the last four digits of your Social Security Number (xxxx) and Date of Birth (mm/dd/yyyy) as indicated and click Submit. You will be taken to the next sign-up page. 5. Create a [employer] ID. This will be your [employer] login ID and cannot be changed, so think of one that is secure and easy to remember. 6. Create a [employer] password. You can change your password at any time. 7. Enter your Password Reset Question and Answer. This can be used at a later time if you forget your password. 8. Enter your e-mail address. You will receive e-mail notification when new information is available in [address] 9. Click Sign Up. You can now view and download portions of your medical record. 10. Click the Download Summary menu link to download a portable copy of your medical information. If you have questions, please visit the Frequently Asked Questions section of the [employer] website.  Remember, [employer] is NOT to be used for urgent needs. For medical emergencies, dial 911. Now available from your iPhone and Android! Please provide this summary of care documentation to your next provider. Your primary care clinician is listed as Jenna Block. If you have any questions after today's visit, please call [phone].

## 2018-09-21 NOTE — PROGRESS NOTES
1. Have you been to the ER, urgent care clinic since your last visit? Hospitalized since your last visit? Yes When: 8/18 Where: [facility] Reason for visit: Abdominal Pain    2. Have you seen or consulted any other health care providers outside of the [address] [place] [address] since your last visit? Include any pap smears or colon screening.  No

## 2018-09-21 NOTE — PROGRESS NOTES
HISTORY OF PRESENT ILLNESS  Howard Lawson is a 60 y.o. male. HPI Comments: Patient is here for follow up of diagnostic tests. Results will be discussed. Cardiomyopathy   The history is provided by the patient. This is a new problem. The problem occurs constantly. The problem has been gradually worsening. Associated symptoms include chest pain and shortness of breath. Chest Pain (Angina)    The history is provided by the patient. This is a new problem. Episode onset: 6 month. The problem occurs every several days. The pain is associated with exertion and rest. The pain is present in the substernal region. The pain is mild. The quality of the pain is described as tightness and heavy. The pain does not radiate. Associated symptoms include shortness of breath. Pertinent negatives include no claudication, no cough, no dizziness, no fever, no hemoptysis, no nausea, no orthopnea, no palpitations, no PND, no sputum production, no vomiting and no weakness. Shortness of Breath   The history is provided by the patient. This is a new problem. The problem occurs intermittently. The problem has not changed since onset. Associated symptoms include chest pain. Pertinent negatives include no fever, no cough, no sputum production, no hemoptysis, no wheezing, no PND, no orthopnea, no vomiting, no rash, no leg swelling and no claudication. The problem's precipitants include exercise. Review of Systems   Constitutional: Negative for chills and fever. HENT: Negative for nosebleeds. Eyes: Negative for blurred vision and double vision. Respiratory: Positive for shortness of breath. Negative for cough, hemoptysis, sputum production and wheezing. Cardiovascular: Positive for chest pain. Negative for palpitations, orthopnea, claudication, leg swelling and PND. Gastrointestinal: Negative for heartburn, nausea and vomiting. Musculoskeletal: Negative for myalgias. Skin: Negative for rash.    Neurological: Negative for dizziness and weakness. Endo/Heme/Allergies: Does not bruise/bleed easily. Family History   Problem Relation Age of Onset    Heart Disease Mother     No Known Problems Father     Heart Disease Brother      enlarged heart       Past Medical History:   Diagnosis Date    Arthritis     Cardiomyopathy ([facility] [address] 7/13/2018    EF reported 45-50% in 2010    Chest pain 7/13/2018    Will anginal pain. Less likely GERD chest wall    Hyperlipidemia 7/13/2018    On statin. Lab with PCP    Nonspecific abnormal electrocardiogram (ECG) (EKG)     Obesity, unspecified     Other and unspecified hyperlipidemia     Other primary cardiomyopathies     Shortness of breath     SOB (shortness of breath) 7/13/2018    Possible CHF, HCVD, angina equivalent.  Systolic CHF, chronic ([facility] [address] 9/21/2018    class2-3     Type 2 diabetes mellitus without complication, without long-term current use of insulin ([facility] [address] 7/13/2018    On metformin. Lab with PCP    Type II or unspecified type diabetes mellitus without mention of complication, not stated as uncontrolled        No past surgical history on file. Social History   Substance Use Topics    Smoking status: Former Smoker     Quit date: 12/6/1998    Smokeless tobacco: Never Used    Alcohol use No      Comment: former heavy drinker       No Known Allergies    Prior to Admission medications    Medication Sig Start Date End Date Taking? Authorizing Provider   carvedilol (COREG) 3.125 mg tablet Take  by mouth two (2) times daily (with meals). Yes Historical Provider   lisinopril (PRINIVIL, ZESTRIL) 5 mg tablet Take  by mouth daily. Yes Historical Provider   digoxin (LANOXIN) 0.125 mg tablet Take  by mouth daily. Yes Historical Provider   spironolactone (ALDACTONE) 25 mg tablet Take  by mouth daily. Yes Historical Provider   simvastatin (ZOCOR) 40 mg tablet Take  by mouth nightly.    Yes Historical Provider   glimepiride (AMARYL) 4 mg tablet Take  by mouth every morning. Yes Historical Provider   furosemide (LASIX) 20 mg tablet Take 40 mg by mouth daily. Yes Historical Provider   aspirin 81 mg chewable tablet Take 1 Tab by mouth daily. 6/22/18  Yes Fang Israel MD   metFORMIN (GLUCOPHAGE) 1,000 mg tablet Take 1,000 mg by mouth two (2) times daily (with meals). Yes Historical Provider   oxyCODONE-acetaminophen (PERCOCET) 5-325 mg per tablet Take 1 Tab by mouth every four (4) hours as needed for Pain. Max Daily Amount: 6 Tabs. 8/29/18   Trenton Becker MD   ondansetron (ZOFRAN ODT) 4 mg disintegrating tablet Take 1 Tab by mouth every eight (8) hours as needed for Nausea. 8/29/18   Trenton Becker MD   ergocalciferol (VITAMIN D2) 50,000 unit capsule Take 50,000 Units by mouth. Historical Provider   metaxalone (SKELAXIN) 800 mg tablet Take 1 Tab by mouth every six (6) hours as needed for Pain (muscle spasm). 8/12/18   Atif Lilly PA-C         Visit Vitals    /66    Pulse 93    Ht 5' 9\" (1.753 m)    Wt 90.3 kg (199 lb)    BMI 29.39 kg/m2         Physical Exam   Constitutional: He is oriented to person, place, and time. He appears well-developed and well-nourished. HENT:   Head: Normocephalic and atraumatic. Left eye blindness   Eyes: Conjunctivae are normal.   Neck: Neck supple. No JVD present. No tracheal deviation present. No thyromegaly present. Cardiovascular: Normal rate, regular rhythm and normal heart sounds. Exam reveals no gallop and no friction rub. No murmur heard. Pulmonary/Chest: No respiratory distress. He has no wheezes. He has rales. He exhibits no tenderness. Abdominal: Soft. There is no tenderness. Musculoskeletal: He exhibits no edema. Neurological: He is alert and oriented to person, place, and time. stuttering   Skin: Skin is warm and dry. Psychiatric: He has a normal mood and affect. Mr. Lucero Smith has a reminder for a \"due or due soon\" health maintenance.  I have asked that he contact his primary care provider for follow-up on this health maintenance. 6/2018  I have personally reviewed patient's records available from hospital and other providers and incorporated findings in patient care. I have personally reviewed patients ekg done at other facility. I Have personally reviewed recent relevant labs available and discussed with patient  Interpretation Summary 8/2018  · Left ventricular severely decreased systolic function. Calculated left ventricular ejection fraction is 15%. Left ventricular cavity size is severely dilated. Left ventricular global hypokinesis. Severe (grade 3) left ventricular diastolic dysfunction. · Right ventricular cavity size is severely dilated. Right ventricular global systolic function is severely reduced. Right ventricular findings are consistent with Ramos's sign. · Right atrial cavity size is severely dilated. · Mild aortic valve sclerosis. · Mild mitral valve regurgitation. · Mild tricuspid valve regurgitation is present. Pulmonary arterial systolic pressure is 53 mmHg. Moderate pulmonary hypertension is present. · Severely elevated central venous pressure (15+ mmHg); IVC diameter is larger than 21 mm and collapses less than 50% with respiration. · Mild pulmonic valve regurgitation is present. Procedure Conclusion   Nuclear Stress Test 8/2018  Abnormal myocardial perfusion imaging. Fixed defect consistent with prior myocardial infarction. Myocardial perfusion imaging supports a high risk stress test.   There is a prior study available for comparison. Moderate size moderate intensity fixed defect of inferior wall. This is associated with severe global hypokinesis and severely reduced systolic function. This could indicate area of infarction in inferior wall but more likely represents nonischemic cardiomyopathy      Interpretation Summary 8/2018  · Gated SPECT: Left ventricular function post-stress was abnormal. Calculated ejection fraction is 20%.   · Baseline ECG: Normal sinus rhythm, non-specific ST-T wave abnormalities. · Inconclusive stress electrocardiogram.  · Myocardial perfusion imaging defect 1: There is a defect that is moderate in size with a moderate reduction in uptake present in the apical to basal inferior location(s) that is non-reversible. There is abnormal wall motion in the defect area. Viability in the area is poor. The defect appears to be infarction. · Abnormal myocardial perfusion imaging. Fixed defect consistent with prior myocardial infarction. Myocardial perfusion imaging supports a high risk stress test.         Conclusion 8/2018-cardiac catheterization     · Normal epicardial coronary arteries  · Normal filling pressures today with LVEDP 8, PCW 7 mean, RA mean 3 and RVEDP of 2      Medical treatment for nonischemic cardiomyopathy          Assessment         ICD-10-CM ICD-9-CM    1. Cardiomyopathy, unspecified type ([address] I42.9 425.4 ECHO ADULT COMPLETE   2. Systolic CHF, chronic (HCC) I50.22 428.22 ECHO ADULT COMPLETE     428.0     class2-3     3. Hyperlipidemia, unspecified hyperlipidemia type E78.5 272.4    4. Type 2 diabetes mellitus without complication, without long-term current use of insulin (HCC) E11.9 250.00    7/2018  Symptoms suggestive of congestive heart failure. Continue with the Lasix. Have not added any ACE inhibitor due to low blood pressure. Will get echo and stress test.  Further plans based on above. 8/2018  Worsening congestive heart failure and cardiomyopathy. Abnormal nuclear scan. Will proceed with cardiac catheterization with possible PCI. Symptoms of CHF appears to be improving. Blood pressure low normal so I would not increase any medication at present. 9/2018  Nonischemic cardiomyopathy no significant CAD. CHF compensated class II-III. Due to low normal blood pressure will not increase any medication at this point. Patient had nausea and vomiting after getting contrast injection. It has resolved. Will recheck echo and decide on ICD    Medications Discontinued During This Encounter   Medication Reason    simvastatin (ZOCOR) 20 mg tablet Dose Adjustment       No orders of the defined types were placed in this encounter. Follow-up Disposition:  Return in about 6 weeks (around 11/2/2018).

## 2018-09-21 NOTE — LETTER
Fabby Peterson 1955 9/21/2018 Dear Belkys Kidd MD 
 
I had the pleasure of evaluating  Mr. Lucero Smith in office today. Below are the relevant portions of my assessment and plan of care. ICD-10-CM ICD-9-CM 1. Cardiomyopathy, unspecified type ([facility] [address] I42.9 425.4 ECHO ADULT COMPLETE 2. Systolic CHF, chronic (HCC) I50.22 428.22 ECHO ADULT COMPLETE  
  428.0   
 class2-3 3. Hyperlipidemia, unspecified hyperlipidemia type E78.5 272.4 4. Type 2 diabetes mellitus without complication, without long-term current use of insulin (HCC) E11.9 250.00 Current Outpatient Prescriptions Medication Sig Dispense Refill  carvedilol (COREG) 3.125 mg tablet Take  by mouth two (2) times daily (with meals).  lisinopril (PRINIVIL, ZESTRIL) 5 mg tablet Take  by mouth daily.  digoxin (LANOXIN) 0.125 mg tablet Take  by mouth daily.  spironolactone (ALDACTONE) 25 mg tablet Take  by mouth daily.  simvastatin (ZOCOR) 40 mg tablet Take  by mouth nightly.  glimepiride (AMARYL) 4 mg tablet Take  by mouth every morning.  furosemide (LASIX) 20 mg tablet Take 40 mg by mouth daily.  aspirin 81 mg chewable tablet Take 1 Tab by mouth daily. 30 Tab 0  
 metFORMIN (GLUCOPHAGE) 1,000 mg tablet Take 1,000 mg by mouth two (2) times daily (with meals).  oxyCODONE-acetaminophen (PERCOCET) 5-325 mg per tablet Take 1 Tab by mouth every four (4) hours as needed for Pain. Max Daily Amount: 6 Tabs. 12 Tab 0  
 ondansetron (ZOFRAN ODT) 4 mg disintegrating tablet Take 1 Tab by mouth every eight (8) hours as needed for Nausea. 12 Tab 0  
 ergocalciferol (VITAMIN D2) 50,000 unit capsule Take 50,000 Units by mouth.  metaxalone (SKELAXIN) 800 mg tablet Take 1 Tab by mouth every six (6) hours as needed for Pain (muscle spasm). 20 Tab 0 Orders Placed This Encounter  carvedilol (COREG) 3.125 mg tablet Sig: Take  by mouth two (2) times daily (with meals).  lisinopril (PRINIVIL, ZESTRIL) 5 mg tablet Sig: Take  by mouth daily.  digoxin (LANOXIN) 0.125 mg tablet Sig: Take  by mouth daily.  spironolactone (ALDACTONE) 25 mg tablet Sig: Take  by mouth daily.  simvastatin (ZOCOR) 40 mg tablet Sig: Take  by mouth nightly. If you have questions, please do not hesitate to call me. I look forward to following Mr. Brayan Viveros along with you. Sincerely, Quan Balderrama MD

## 2018-11-16 ENCOUNTER — OFFICE VISIT (OUTPATIENT)
Dept: CARDIOLOGY CLINIC | Age: 63
End: 2018-11-16

## 2018-11-16 VITALS
DIASTOLIC BLOOD PRESSURE: 83 MMHG | BODY MASS INDEX: 30.96 KG/M2 | HEIGHT: 69 IN | SYSTOLIC BLOOD PRESSURE: 116 MMHG | HEART RATE: 92 BPM | WEIGHT: 209 LBS

## 2018-11-16 DIAGNOSIS — E11.9 TYPE 2 DIABETES MELLITUS WITHOUT COMPLICATION, WITHOUT LONG-TERM CURRENT USE OF INSULIN (HCC): ICD-10-CM

## 2018-11-16 DIAGNOSIS — I50.22 SYSTOLIC CHF, CHRONIC (HCC): ICD-10-CM

## 2018-11-16 DIAGNOSIS — E78.5 HYPERLIPIDEMIA, UNSPECIFIED HYPERLIPIDEMIA TYPE: ICD-10-CM

## 2018-11-16 DIAGNOSIS — I42.9 CARDIOMYOPATHY, UNSPECIFIED TYPE (HCC): Primary | ICD-10-CM

## 2018-11-16 RX ORDER — CARVEDILOL 3.12 MG/1
3.12 TABLET ORAL 2 TIMES DAILY WITH MEALS
Qty: 30 TAB | Refills: 6 | Status: SHIPPED | OUTPATIENT
Start: 2018-11-16 | End: 2019-03-28 | Stop reason: SDUPTHER

## 2018-11-16 RX ORDER — SPIRONOLACTONE 25 MG/1
25 TABLET ORAL DAILY
Qty: 30 TAB | Refills: 6 | Status: SHIPPED | OUTPATIENT
Start: 2018-11-16 | End: 2019-03-11 | Stop reason: SDUPTHER

## 2018-11-16 RX ORDER — SIMVASTATIN 40 MG/1
40 TABLET, FILM COATED ORAL
Qty: 30 TAB | Refills: 6 | Status: SHIPPED | OUTPATIENT
Start: 2018-11-16 | End: 2019-05-06 | Stop reason: SDUPTHER

## 2018-11-16 RX ORDER — LISINOPRIL 5 MG/1
5 TABLET ORAL DAILY
Qty: 30 TAB | Refills: 6 | Status: SHIPPED | OUTPATIENT
Start: 2018-11-16 | End: 2019-03-11 | Stop reason: SDUPTHER

## 2018-11-16 RX ORDER — DIGOXIN 125 MCG
0.12 TABLET ORAL DAILY
Qty: 30 TAB | Refills: 6 | Status: SHIPPED | OUTPATIENT
Start: 2018-11-16 | End: 2018-12-10 | Stop reason: SDUPTHER

## 2018-11-16 NOTE — PROGRESS NOTES
1. Have you been to the ER, urgent care clinic since your last visit? Hospitalized since your last visit? No    2. Have you seen or consulted any other health care providers outside of the [address] [place] [address] since your last visit? Include any pap smears or colon screening.  No

## 2018-11-16 NOTE — LETTER
Effie Pedro 1955 11/16/2018 Dear Brigitte Nissen, MD 
 
I had the pleasure of evaluating  Mr. Remy Garcia in office today. Below are the relevant portions of my assessment and plan of care. ICD-10-CM ICD-9-CM 1. Cardiomyopathy, unspecified type ([facility] [address] I42.9 425.4 Severe. Ejection fraction remains below 25%. Patient is noncompliant to medication has stopped all of them we have restarted and given instructions 2. Systolic CHF, chronic (HCC) I50.22 428.22   
  428.0 Class III 3. Hyperlipidemia, unspecified hyperlipidemia type E78.5 272.4 Stable lab with PCP Zocor restarted 4. Type 2 diabetes mellitus without complication, without long-term current use of insulin (HCC) E11.9 250.00 Discussed compliance with therapy Current Outpatient Medications Medication Sig Dispense Refill  carvedilol (COREG) 3.125 mg tablet Take 1 Tab by mouth two (2) times daily (with meals). 30 Tab 6  
 lisinopril (PRINIVIL, ZESTRIL) 5 mg tablet Take 1 Tab by mouth daily. 30 Tab 6  
 digoxin (LANOXIN) 0.125 mg tablet Take 1 Tab by mouth daily. 30 Tab 6  
 spironolactone (ALDACTONE) 25 mg tablet Take 1 Tab by mouth daily. 30 Tab 6  
 simvastatin (ZOCOR) 40 mg tablet Take 1 Tab by mouth nightly. 30 Tab 6  furosemide (LASIX) 20 mg tablet Take 40 mg by mouth daily.  aspirin 81 mg chewable tablet Take 1 Tab by mouth daily. 30 Tab 0  
 metFORMIN (GLUCOPHAGE) 1,000 mg tablet Take 1,000 mg by mouth two (2) times daily (with meals).  oxyCODONE-acetaminophen (PERCOCET) 5-325 mg per tablet Take 1 Tab by mouth every four (4) hours as needed for Pain. Max Daily Amount: 6 Tabs. 12 Tab 0  
 glimepiride (AMARYL) 4 mg tablet Take  by mouth every morning. Orders Placed This Encounter  carvedilol (COREG) 3.125 mg tablet Sig: Take 1 Tab by mouth two (2) times daily (with meals). Dispense:  30 Tab Refill:  6  
 lisinopril (PRINIVIL, ZESTRIL) 5 mg tablet Sig: Take 1 Tab by mouth daily. Dispense:  30 Tab Refill:  6  
 digoxin (LANOXIN) 0.125 mg tablet Sig: Take 1 Tab by mouth daily. Dispense:  30 Tab Refill:  6  
 spironolactone (ALDACTONE) 25 mg tablet Sig: Take 1 Tab by mouth daily. Dispense:  30 Tab Refill:  6  
 simvastatin (ZOCOR) 40 mg tablet Sig: Take 1 Tab by mouth nightly. Dispense:  30 Tab Refill:  6 If you have questions, please do not hesitate to call me. I look forward to following  Hever Arrington along with you. Sincerely, Tona Koehler MD

## 2018-11-16 NOTE — PROGRESS NOTES
HISTORY OF PRESENT ILLNESS  Brad Smith is a 60 y.o. male. Cardiomyopathy   The history is provided by the patient. This is a new problem. The problem occurs constantly. The problem has been gradually worsening. Associated symptoms include chest pain and shortness of breath. Chest Pain (Angina)    The history is provided by the patient. This is a new problem. Episode onset: 6 month. The problem occurs every several days. The pain is associated with exertion and rest. The pain is present in the substernal region. The pain is mild. The quality of the pain is described as tightness and heavy. The pain does not radiate. Associated symptoms include shortness of breath. Pertinent negatives include no claudication, no cough, no dizziness, no fever, no hemoptysis, no nausea, no orthopnea, no palpitations, no PND, no sputum production, no vomiting and no weakness. Shortness of Breath   The history is provided by the patient. This is a new problem. The problem occurs intermittently. The problem has not changed since onset. Associated symptoms include chest pain. Pertinent negatives include no fever, no cough, no sputum production, no hemoptysis, no wheezing, no PND, no orthopnea, no vomiting, no rash, no leg swelling and no claudication. The problem's precipitants include exercise. Review of Systems   Constitutional: Negative for chills and fever. HENT: Negative for nosebleeds. Eyes: Negative for blurred vision and double vision. Respiratory: Positive for shortness of breath. Negative for cough, hemoptysis, sputum production and wheezing. Cardiovascular: Positive for chest pain. Negative for palpitations, orthopnea, claudication, leg swelling and PND. Gastrointestinal: Negative for heartburn, nausea and vomiting. Musculoskeletal: Negative for myalgias. Skin: Negative for rash. Neurological: Negative for dizziness and weakness. Endo/Heme/Allergies: Does not bruise/bleed easily. Family History   Problem Relation Age of Onset    Heart Disease Mother     No Known Problems Father     Heart Disease Brother         enlarged heart       Past Medical History:   Diagnosis Date    Arthritis     Cardiomyopathy ([facility] [address] 2018    EF reported 45-50% in 2010    Chest pain 2018    Will anginal pain. Less likely GERD chest wall    Hyperlipidemia 2018    On statin. Lab with PCP    Nonspecific abnormal electrocardiogram (ECG) (EKG)     Obesity, unspecified     Other and unspecified hyperlipidemia     Other primary cardiomyopathies     Shortness of breath     SOB (shortness of breath) 2018    Possible CHF, HCVD, angina equivalent.  Systolic CHF, chronic ([facility] [address] 2018    class2-3     Type 2 diabetes mellitus without complication, without long-term current use of insulin ([facility] [address] 2018    On metformin. Lab with PCP    Type II or unspecified type diabetes mellitus without mention of complication, not stated as uncontrolled        History reviewed. No pertinent surgical history. Social History     Tobacco Use    Smoking status: Former Smoker     Last attempt to quit: 1998     Years since quittin.9    Smokeless tobacco: Never Used   Substance Use Topics    Alcohol use: No     Comment: former heavy drinker       No Known Allergies    Prior to Admission medications    Medication Sig Start Date End Date Taking? Authorizing Provider   carvedilol (COREG) 3.125 mg tablet Take 1 Tab by mouth two (2) times daily (with meals). 18  Yes Bobbi Wall MD   lisinopril (PRINIVIL, ZESTRIL) 5 mg tablet Take 1 Tab by mouth daily. 18  Yes Bobbi Wall MD   digoxin (LANOXIN) 0.125 mg tablet Take 1 Tab by mouth daily. 18  Yes Bobbi Wall MD   spironolactone (ALDACTONE) 25 mg tablet Take 1 Tab by mouth daily. 18  Yes Bobbi Wall MD   simvastatin (ZOCOR) 40 mg tablet Take 1 Tab by mouth nightly.  18  Yes Bobbi Wall MD furosemide (LASIX) 20 mg tablet Take 40 mg by mouth daily. Yes Provider, Historical   aspirin 81 mg chewable tablet Take 1 Tab by mouth daily. 6/22/18  Yes Branden Valdez MD   metFORMIN (GLUCOPHAGE) 1,000 mg tablet Take 1,000 mg by mouth two (2) times daily (with meals). Yes Provider, Historical   oxyCODONE-acetaminophen (PERCOCET) 5-325 mg per tablet Take 1 Tab by mouth every four (4) hours as needed for Pain. Max Daily Amount: 6 Tabs. 8/29/18   Josefina Louis MD   glimepiride (AMARYL) 4 mg tablet Take  by mouth every morning. Provider, Historical             Visit Vitals  /83   Pulse 92   Ht 5' 9\" (1.753 m)   Wt 94.8 kg (209 lb)   BMI 30.86 kg/m²         Physical Exam   Constitutional: He is oriented to person, place, and time. He appears well-developed and well-nourished. HENT:   Head: Normocephalic and atraumatic. Left eye blindness   Eyes: Conjunctivae are normal.   Neck: Neck supple. No JVD present. No tracheal deviation present. No thyromegaly present. Cardiovascular: Normal rate, regular rhythm and normal heart sounds. Exam reveals no gallop and no friction rub. No murmur heard. Pulmonary/Chest: No respiratory distress. He has no wheezes. He has rales. He exhibits no tenderness. Abdominal: Soft. There is no tenderness. Musculoskeletal: He exhibits no edema. Neurological: He is alert and oriented to person, place, and time. stuttering   Skin: Skin is warm and dry. Psychiatric: He has a normal mood and affect. Mr. Jalene Cooks has a reminder for a \"due or due soon\" health maintenance. I have asked that he contact his primary care provider for follow-up on this health maintenance. 6/2018  I have personally reviewed patient's records available from hospital and other providers and incorporated findings in patient care. I have personally reviewed patients ekg done at other facility.   I Have personally reviewed recent relevant labs available and discussed with patient  Interpretation Summary 8/2018  · Left ventricular severely decreased systolic function. Calculated left ventricular ejection fraction is 15%. Left ventricular cavity size is severely dilated. Left ventricular global hypokinesis. Severe (grade 3) left ventricular diastolic dysfunction. · Right ventricular cavity size is severely dilated. Right ventricular global systolic function is severely reduced. Right ventricular findings are consistent with Ramos's sign. · Right atrial cavity size is severely dilated. · Mild aortic valve sclerosis. · Mild mitral valve regurgitation. · Mild tricuspid valve regurgitation is present. Pulmonary arterial systolic pressure is 53 mmHg. Moderate pulmonary hypertension is present. · Severely elevated central venous pressure (15+ mmHg); IVC diameter is larger than 21 mm and collapses less than 50% with respiration. · Mild pulmonic valve regurgitation is present. Procedure Conclusion   Nuclear Stress Test 8/2018  Abnormal myocardial perfusion imaging. Fixed defect consistent with prior myocardial infarction. Myocardial perfusion imaging supports a high risk stress test.   There is a prior study available for comparison. Moderate size moderate intensity fixed defect of inferior wall. This is associated with severe global hypokinesis and severely reduced systolic function. This could indicate area of infarction in inferior wall but more likely represents nonischemic cardiomyopathy      Interpretation Summary 8/2018  · Gated SPECT: Left ventricular function post-stress was abnormal. Calculated ejection fraction is 20%. · Baseline ECG: Normal sinus rhythm, non-specific ST-T wave abnormalities. · Inconclusive stress electrocardiogram.  · Myocardial perfusion imaging defect 1: There is a defect that is moderate in size with a moderate reduction in uptake present in the apical to basal inferior location(s) that is non-reversible.  There is abnormal wall motion in the defect area. Viability in the area is poor. The defect appears to be infarction. · Abnormal myocardial perfusion imaging. Fixed defect consistent with prior myocardial infarction. Myocardial perfusion imaging supports a high risk stress test.         Conclusion 8/2018-cardiac catheterization     · Normal epicardial coronary arteries  · Normal filling pressures today with LVEDP 8, PCW 7 mean, RA mean 3 and RVEDP of 2      Medical treatment for nonischemic cardiomyopathy      10/2018  Echocardiogram exam  · Left ventricular severely decreased systolic function. Estimated left ventricular ejection fraction is 21 - 25%. Left ventricular cavity size is dilated. Left ventricular global hypokinesis. Severe (grade 3) left ventricular diastolic dysfunction. · Left atrial cavity size is severely dilated. · Right ventricular cavity size is severely dilated. · Right atrial cavity size is moderately dilated. · Mild aortic valve focal thickening present. · Mild mitral valve regurgitation. · Mild tricuspid valve regurgitation is present. Mild pulmonary hypertension is present. Estimated pulmonary artery systolic pressure is 48 mmHg. · Mild pulmonic valve regurgitation is present. · Mildly elevated central venous pressure (5-10 mmHg); IVC diameter is less than 21 mm and collapses less than 50% with respiration. Assessment         ICD-10-CM ICD-9-CM    1. Cardiomyopathy, unspecified type ([address] I42.9 425.4     Severe. Ejection fraction remains below 25%. Patient is noncompliant to medication has stopped all of them we have restarted and given instructions   2. Systolic CHF, chronic ([facility]) I50.22 428.22      428.0     Class III   3. Hyperlipidemia, unspecified hyperlipidemia type E78.5 272.4     Stable lab with PCP Zocor restarted   4.  Type 2 diabetes mellitus without complication, without long-term current use of insulin ([facility]) E11.9 250.00     Discussed compliance with therapy   7/2018  Symptoms suggestive of congestive heart failure. Continue with the Lasix. Have not added any ACE inhibitor due to low blood pressure. Will get echo and stress test.  Further plans based on above. 8/2018  Worsening congestive heart failure and cardiomyopathy. Abnormal nuclear scan. Will proceed with cardiac catheterization with possible PCI. Symptoms of CHF appears to be improving. Blood pressure low normal so I would not increase any medication at present. 9/2018  Nonischemic cardiomyopathy no significant CAD. CHF compensated class II-III. Due to low normal blood pressure will not increase any medication at this point. Patient had nausea and vomiting after getting contrast injection. It has resolved. Will recheck echo and decide on ICD  11/2018  Cardiomyopathy remains severe. CHF class III. Patient has stopped all his cardiac medication. I have restarted and instructed on compliance. Discussed ICD. Wants To hold off at this time  Medications Discontinued During This Encounter   Medication Reason    carvedilol (COREG) 3.125 mg tablet Reorder    lisinopril (PRINIVIL, ZESTRIL) 5 mg tablet Reorder    digoxin (LANOXIN) 0.125 mg tablet Reorder    spironolactone (ALDACTONE) 25 mg tablet Reorder    simvastatin (ZOCOR) 40 mg tablet Reorder    metaxalone (SKELAXIN) 800 mg tablet Not A Current Medication    ergocalciferol (VITAMIN D2) 50,000 unit capsule Not A Current Medication    ondansetron (ZOFRAN ODT) 4 mg disintegrating tablet Not A Current Medication       Orders Placed This Encounter    carvedilol (COREG) 3.125 mg tablet     Sig: Take 1 Tab by mouth two (2) times daily (with meals). Dispense:  30 Tab     Refill:  6    lisinopril (PRINIVIL, ZESTRIL) 5 mg tablet     Sig: Take 1 Tab by mouth daily. Dispense:  30 Tab     Refill:  6    digoxin (LANOXIN) 0.125 mg tablet     Sig: Take 1 Tab by mouth daily.      Dispense:  30 Tab     Refill:  6    spironolactone (ALDACTONE) 25 mg tablet     Sig: Take 1 Tab by mouth daily. Dispense:  30 Tab     Refill:  6    simvastatin (ZOCOR) 40 mg tablet     Sig: Take 1 Tab by mouth nightly. Dispense:  30 Tab     Refill:  6       Follow-up Disposition:  Return in about 3 months (around 2/16/2019).

## 2018-12-10 RX ORDER — DIGOXIN 125 MCG
0.12 TABLET ORAL DAILY
Qty: 90 TAB | Refills: 3 | Status: SHIPPED | OUTPATIENT
Start: 2018-12-10 | End: 2019-08-07 | Stop reason: SDUPTHER

## 2019-02-08 ENCOUNTER — OFFICE VISIT (OUTPATIENT)
Dept: CARDIOLOGY CLINIC | Age: 64
End: 2019-02-08

## 2019-02-08 VITALS
SYSTOLIC BLOOD PRESSURE: 96 MMHG | DIASTOLIC BLOOD PRESSURE: 61 MMHG | WEIGHT: 211 LBS | HEART RATE: 89 BPM | BODY MASS INDEX: 31.25 KG/M2 | HEIGHT: 69 IN

## 2019-02-08 DIAGNOSIS — I50.22 SYSTOLIC CHF, CHRONIC (HCC): Primary | ICD-10-CM

## 2019-02-08 DIAGNOSIS — I42.9 CARDIOMYOPATHY, UNSPECIFIED TYPE (HCC): ICD-10-CM

## 2019-02-08 DIAGNOSIS — E78.5 HYPERLIPIDEMIA, UNSPECIFIED HYPERLIPIDEMIA TYPE: ICD-10-CM

## 2019-02-08 DIAGNOSIS — E11.9 TYPE 2 DIABETES MELLITUS WITHOUT COMPLICATION, WITHOUT LONG-TERM CURRENT USE OF INSULIN (HCC): ICD-10-CM

## 2019-02-08 NOTE — PROGRESS NOTES
HISTORY OF PRESENT ILLNESS  Alysa Staton is a 58 y.o. male. Cardiomyopathy   The history is provided by the patient. This is a new problem. The problem occurs constantly. The problem has been gradually worsening. Associated symptoms include shortness of breath. Pertinent negatives include no chest pain, no abdominal pain and no headaches. Shortness of Breath   The history is provided by the patient. This is a new problem. The problem occurs intermittently. The problem has been gradually improving. Pertinent negatives include no fever, no headaches, no cough, no sputum production, no hemoptysis, no wheezing, no PND, no orthopnea, no chest pain, no vomiting, no abdominal pain, no rash, no leg swelling and no claudication. The problem's precipitants include exercise. Review of Systems   Constitutional: Negative for chills and fever. HENT: Negative for nosebleeds. Eyes: Negative for blurred vision and double vision. Respiratory: Positive for shortness of breath. Negative for cough, hemoptysis, sputum production and wheezing. Cardiovascular: Negative for chest pain, palpitations, orthopnea, claudication, leg swelling and PND. Gastrointestinal: Negative for abdominal pain, heartburn, nausea and vomiting. Musculoskeletal: Negative for myalgias. Skin: Negative for rash. Neurological: Negative for dizziness, weakness and headaches. Endo/Heme/Allergies: Does not bruise/bleed easily. Family History   Problem Relation Age of Onset    Heart Disease Mother     No Known Problems Father     Heart Disease Brother         enlarged heart       Past Medical History:   Diagnosis Date    Arthritis     Cardiomyopathy ([facility] [address] 7/13/2018    EF reported 45-50% in 2010    Chest pain 7/13/2018    Will anginal pain. Less likely GERD chest wall    Hyperlipidemia 7/13/2018    On statin.   Lab with PCP    Nonspecific abnormal electrocardiogram (ECG) (EKG)     Obesity, unspecified     Other and unspecified hyperlipidemia     Other primary cardiomyopathies     Shortness of breath     SOB (shortness of breath) 2018    Possible CHF, HCVD, angina equivalent.  Systolic CHF, chronic ([facility] [address] 2018    class2-3     Type 2 diabetes mellitus without complication, without long[facility]term [facility] 2018    On metformin. Lab with PCP    Type II or unspecified type diabetes mellitus without mention of complication, not stated as uncontrolled        History reviewed. No pertinent surgical history. Social History     Tobacco Use    Smoking status: Former Smoker     Last attempt to quit: 1998     Years since quittin.1    Smokeless tobacco: Never Used   Substance Use Topics    Alcohol use: No     Comment: former heavy drinker       No Known Allergies    Prior to Admission medications    Medication Sig Start Date End Date Taking? Authorizing Provider   digoxin (LANOXIN) 0.125 mg tablet Take 1 Tab by mouth daily. 12/10/18  Yes Imani Liang NP   carvedilol (COREG) 3.125 mg tablet Take 1 Tab by mouth two (2) times daily (with meals). 18  Yes Danielle Conroy MD   lisinopril (PRINIVIL, ZESTRIL) 5 mg tablet Take 1 Tab by mouth daily. 18  Yes Danielle Conroy MD   spironolactone (ALDACTONE) 25 mg tablet Take 1 Tab by mouth daily. 18  Yes Danielle Conroy MD   simvastatin (ZOCOR) 40 mg tablet Take 1 Tab by mouth nightly. 18  Yes Danielle Conroy MD   furosemide (LASIX) 20 mg tablet Take 40 mg by mouth daily. Yes Provider, Historical   aspirin 81 mg chewable tablet Take 1 Tab by mouth daily. 18  Yes Deon Garcia MD   metFORMIN (GLUCOPHAGE) 1,000 mg tablet Take 1,000 mg by mouth two (2) times daily (with meals). Yes Provider, Historical   oxyCODONE-acetaminophen (PERCOCET) 5-325 mg per tablet Take 1 Tab by mouth every four (4) hours as needed for Pain. Max Daily Amount: 6 Tabs.  18   Mehran Rosas MD             Visit Vitals  BP 31/36 Pulse 89   Ht 5' 9\" (1.753 m)   Wt 95.7 kg (211 lb)   BMI 31.16 kg/m²         Physical Exam   Constitutional: He is oriented to person, place, and time. He appears well-developed and well-nourished. HENT:   Head: Normocephalic and atraumatic. Left eye blindness   Eyes: Conjunctivae are normal.   Neck: Neck supple. No JVD present. No tracheal deviation present. No thyromegaly present. Cardiovascular: Normal rate, regular rhythm and normal heart sounds. Exam reveals no gallop and no friction rub. No murmur heard. Pulmonary/Chest: No respiratory distress. He has no wheezes. He has no rales. He exhibits no tenderness. Abdominal: Soft. There is no tenderness. Musculoskeletal: He exhibits no edema. Neurological: He is alert and oriented to person, place, and time. stuttering   Skin: Skin is warm and dry. Psychiatric: He has a normal mood and affect. Mr. Jeffery Munoz has a reminder for a \"due or due soon\" health maintenance. I have asked that he contact his primary care provider for follow-up on this health maintenance. 6/2018  I have personally reviewed patient's records available from hospital and other providers and incorporated findings in patient care. I have personally reviewed patients ekg done at other facility. I Have personally reviewed recent relevant labs available and discussed with patient  Interpretation Summary 8/2018  · Left ventricular severely decreased systolic function. Calculated left ventricular ejection fraction is 15%. Left ventricular cavity size is severely dilated. Left ventricular global hypokinesis. Severe (grade 3) left ventricular diastolic dysfunction. · Right ventricular cavity size is severely dilated. Right ventricular global systolic function is severely reduced. Right ventricular findings are consistent with Ramos's sign. · Right atrial cavity size is severely dilated. · Mild aortic valve sclerosis. · Mild mitral valve regurgitation.   · Mild tricuspid valve regurgitation is present. Pulmonary arterial systolic pressure is 53 mmHg. Moderate pulmonary hypertension is present. · Severely elevated central venous pressure (15+ mmHg); IVC diameter is larger than 21 mm and collapses less than 50% with respiration. · Mild pulmonic valve regurgitation is present. Procedure Conclusion   Nuclear Stress Test 8/2018  Abnormal myocardial perfusion imaging. Fixed defect consistent with prior myocardial infarction. Myocardial perfusion imaging supports a high risk stress test.   There is a prior study available for comparison. Moderate size moderate intensity fixed defect of inferior wall. This is associated with severe global hypokinesis and severely reduced systolic function. This could indicate area of infarction in inferior wall but more likely represents nonischemic cardiomyopathy      Interpretation Summary 8/2018  · Gated SPECT: Left ventricular function post-stress was abnormal. Calculated ejection fraction is 20%. · Baseline ECG: Normal sinus rhythm, non-specific ST-T wave abnormalities. · Inconclusive stress electrocardiogram.  · Myocardial perfusion imaging defect 1: There is a defect that is moderate in size with a moderate reduction in uptake present in the apical to basal inferior location(s) that is non-reversible. There is abnormal wall motion in the defect area. Viability in the area is poor. The defect appears to be infarction. · Abnormal myocardial perfusion imaging. Fixed defect consistent with prior myocardial infarction. Myocardial perfusion imaging supports a high risk stress test.         Conclusion 8/2018-cardiac catheterization     · Normal epicardial coronary arteries  · Normal filling pressures today with LVEDP 8, PCW 7 mean, RA mean 3 and RVEDP of 2      Medical treatment for nonischemic cardiomyopathy      10/2018  Echocardiogram exam  · Left ventricular severely decreased systolic function.  Estimated left ventricular ejection fraction is 21 - 25%. Left ventricular cavity size is dilated. Left ventricular global hypokinesis. Severe (grade 3) left ventricular diastolic dysfunction. · Left atrial cavity size is severely dilated. · Right ventricular cavity size is severely dilated. · Right atrial cavity size is moderately dilated. · Mild aortic valve focal thickening present. · Mild mitral valve regurgitation. · Mild tricuspid valve regurgitation is present. Mild pulmonary hypertension is present. Estimated pulmonary artery systolic pressure is 48 mmHg. · Mild pulmonic valve regurgitation is present. · Mildly elevated central venous pressure (5-10 mmHg); IVC diameter is less than 21 mm and collapses less than 50% with respiration. Assessment         ICD-10-CM ICD-9-CM    1. Systolic CHF, chronic ([facility]) I50.22 428.22      428.0     Stable compensated class II   2. Cardiomyopathy, unspecified type ([facility] [address] I42.9 425.4     Stable   3. Hyperlipidemia, unspecified hyperlipidemia type E78.5 272.4     Continue treatment lab with PCP   4. Type 2 diabetes mellitus without complication, without long-term current use of insulin ([facility]) E11.9 250.00    7/2018  Symptoms suggestive of congestive heart failure. Continue with the Lasix. Have not added any ACE inhibitor due to low blood pressure. Will get echo and stress test.  Further plans based on above. 8/2018  Worsening congestive heart failure and cardiomyopathy. Abnormal nuclear scan. Will proceed with cardiac catheterization with possible PCI. Symptoms of CHF appears to be improving. Blood pressure low normal so I would not increase any medication at present. 9/2018  Nonischemic cardiomyopathy no significant CAD. CHF compensated class II-III. Due to low normal blood pressure will not increase any medication at this point. Patient had nausea and vomiting after getting contrast injection. It has resolved.   Will recheck echo and decide on ICD  11/2018  Cardiomyopathy remains severe. CHF class III. Patient has stopped all his cardiac medication. I have restarted and instructed on compliance. Discussed ICD. Wants To hold off at this time  2/2019  Nonischemic cardiomyopathy clinically compensated. Class II. Symptoms are improving. Does not want ICD at present  Medications Discontinued During This Encounter   Medication Reason    glimepiride (AMARYL) 4 mg tablet Not A Current Medication       No orders of the defined types were placed in this encounter. Follow-up Disposition:  Return in about 6 months (around 8/8/2019).

## 2019-02-08 NOTE — PROGRESS NOTES
1. Have you been to the ER, urgent care clinic since your last visit? Hospitalized since your last visit? No    2. Have you seen or consulted any other health care providers outside of the [address] [place] [address] since your last visit? Include any pap smears or colon screening.  No

## 2019-02-08 NOTE — LETTER
Santiago Prateek 1955 2/8/2019 Dear Aziza Deluca MD 
 
I had the pleasure of evaluating  Mr. Juan Ramon Smith in office today. Below are the relevant portions of my assessment and plan of care. ICD-10-CM ICD-9-CM 1. Systolic CHF, chronic (HCC) I50.22 428.22   
  428.0 Stable compensated class II  
2. Cardiomyopathy, unspecified type ([facility] [address] I42.9 425.4 Stable 3. Hyperlipidemia, unspecified hyperlipidemia type E78.5 272.4 Continue treatment lab with PCP 4. Type 2 diabetes mellitus without complication, without long-term current use of insulin (HCC) E11.9 250.00 Current Outpatient Medications Medication Sig Dispense Refill  digoxin (LANOXIN) 0.125 mg tablet Take 1 Tab by mouth daily. 90 Tab 3  carvedilol (COREG) 3.125 mg tablet Take 1 Tab by mouth two (2) times daily (with meals). 30 Tab 6  
 lisinopril (PRINIVIL, ZESTRIL) 5 mg tablet Take 1 Tab by mouth daily. 30 Tab 6  
 spironolactone (ALDACTONE) 25 mg tablet Take 1 Tab by mouth daily. 30 Tab 6  
 simvastatin (ZOCOR) 40 mg tablet Take 1 Tab by mouth nightly. 30 Tab 6  furosemide (LASIX) 20 mg tablet Take 40 mg by mouth daily.  aspirin 81 mg chewable tablet Take 1 Tab by mouth daily. 30 Tab 0  
 metFORMIN (GLUCOPHAGE) 1,000 mg tablet Take 1,000 mg by mouth two (2) times daily (with meals).  oxyCODONE-acetaminophen (PERCOCET) 5-325 mg per tablet Take 1 Tab by mouth every four (4) hours as needed for Pain. Max Daily Amount: 6 Tabs. 12 Tab 0 No orders of the defined types were placed in this encounter. If you have questions, please do not hesitate to call me. I look forward to following Mr. Juan Ramon Smith along with you. Sincerely, Kenton Mckinley MD

## 2019-03-11 RX ORDER — LISINOPRIL 5 MG/1
TABLET ORAL
Qty: 30 TAB | Refills: 5 | Status: SHIPPED | OUTPATIENT
Start: 2019-03-11 | End: 2019-11-05 | Stop reason: SDUPTHER

## 2019-03-11 RX ORDER — SPIRONOLACTONE 25 MG/1
TABLET ORAL
Qty: 30 TAB | Refills: 5 | Status: SHIPPED | OUTPATIENT
Start: 2019-03-11 | End: 2019-11-04 | Stop reason: SDUPTHER

## 2019-03-28 RX ORDER — CARVEDILOL 3.12 MG/1
TABLET ORAL
Qty: 30 TAB | Refills: 6 | Status: SHIPPED | OUTPATIENT
Start: 2019-03-28 | End: 2019-08-05 | Stop reason: SDUPTHER

## 2019-05-06 RX ORDER — SIMVASTATIN 40 MG/1
TABLET, FILM COATED ORAL
Qty: 30 TAB | Refills: 4 | Status: SHIPPED | OUTPATIENT
Start: 2019-05-06 | End: 2019-12-15 | Stop reason: SDUPTHER

## 2019-08-05 RX ORDER — CARVEDILOL 3.12 MG/1
TABLET ORAL
Qty: 30 TAB | Refills: 6 | Status: SHIPPED | OUTPATIENT
Start: 2019-08-05 | End: 2019-12-06 | Stop reason: SDUPTHER

## 2019-08-07 RX ORDER — DIGOXIN 125 MCG
0.12 TABLET ORAL DAILY
Qty: 90 TAB | Refills: 3 | Status: SHIPPED | OUTPATIENT
Start: 2019-08-07 | End: 2020-08-08

## 2019-11-05 RX ORDER — LISINOPRIL 5 MG/1
TABLET ORAL
Qty: 90 TAB | Refills: 1 | Status: SHIPPED | OUTPATIENT
Start: 2019-11-05 | End: 2020-05-01 | Stop reason: SDUPTHER

## 2019-11-15 ENCOUNTER — OFFICE VISIT (OUTPATIENT)
Dept: CARDIOLOGY CLINIC | Age: 64
End: 2019-11-15

## 2019-11-15 VITALS
SYSTOLIC BLOOD PRESSURE: 104 MMHG | BODY MASS INDEX: 30.96 KG/M2 | HEIGHT: 69 IN | WEIGHT: 209 LBS | DIASTOLIC BLOOD PRESSURE: 63 MMHG | HEART RATE: 73 BPM

## 2019-11-15 DIAGNOSIS — E78.5 HYPERLIPIDEMIA, UNSPECIFIED HYPERLIPIDEMIA TYPE: ICD-10-CM

## 2019-11-15 DIAGNOSIS — I50.22 SYSTOLIC CHF, CHRONIC (HCC): Primary | ICD-10-CM

## 2019-11-15 DIAGNOSIS — I42.9 CARDIOMYOPATHY, UNSPECIFIED TYPE (HCC): ICD-10-CM

## 2019-11-15 DIAGNOSIS — E11.9 TYPE 2 DIABETES MELLITUS WITHOUT COMPLICATION, WITHOUT LONG-TERM CURRENT USE OF INSULIN (HCC): ICD-10-CM

## 2019-11-15 NOTE — PROGRESS NOTES
1. Have you been to the ER, urgent care clinic since your last visit? Hospitalized since your last visit? No    2. Have you seen or consulted any other health care providers outside of the [address] [place] [address] since your last visit? Include any pap smears or colon screening.  No

## 2019-11-15 NOTE — PROGRESS NOTES
HISTORY OF PRESENT ILLNESS  Sujata Lawson is a 58 y.o. male. Cardiomyopathy   The history is provided by the patient. This is a new problem. The problem occurs constantly. The problem has been gradually worsening. Associated symptoms include shortness of breath. Pertinent negatives include no chest pain, no abdominal pain and no headaches. Shortness of Breath   The history is provided by the patient. This is a new problem. The problem occurs intermittently. The problem has been gradually improving. Pertinent negatives include no fever, no headaches, no cough, no sputum production, no hemoptysis, no wheezing, no PND, no orthopnea, no chest pain, no vomiting, no abdominal pain, no rash, no leg swelling and no claudication. The problem's precipitants include exercise. Review of Systems   Constitutional: Negative for chills and fever. HENT: Negative for nosebleeds. Eyes: Negative for blurred vision and double vision. Respiratory: Positive for shortness of breath. Negative for cough, hemoptysis, sputum production and wheezing. Cardiovascular: Negative for chest pain, palpitations, orthopnea, claudication, leg swelling and PND. Gastrointestinal: Negative for abdominal pain, heartburn, nausea and vomiting. Musculoskeletal: Negative for myalgias. Skin: Negative for rash. Neurological: Negative for dizziness, weakness and headaches. Endo/Heme/Allergies: Does not bruise/bleed easily. Family History   Problem Relation Age of Onset    Heart Disease Mother     No Known Problems Father     Heart Disease Brother         enlarged heart       Past Medical History:   Diagnosis Date    Arthritis     Cardiomyopathy ([facility] [address] 7/13/2018    EF reported 45-50% in 2010    Chest pain 7/13/2018    Will anginal pain. Less likely GERD chest wall    Hyperlipidemia 7/13/2018    On statin.   Lab with PCP    Nonspecific abnormal electrocardiogram (ECG) (EKG)     Obesity, unspecified     Other and unspecified hyperlipidemia     Other primary cardiomyopathies     Shortness of breath     SOB (shortness of breath) 2018    Possible CHF, HCVD, angina equivalent.  Systolic CHF, chronic ([facility] [address] 2018    class2-3     Type 2 diabetes mellitus without complication, without long-term current use of insulin ([facility] 2018    On metformin. Lab with PCP    Type II or unspecified type diabetes mellitus without mention of complication, not stated as uncontrolled        No past surgical history on file. Social History     Tobacco Use    Smoking status: Former Smoker     Last attempt to quit: 1998     Years since quittin.9    Smokeless tobacco: Never Used   Substance Use Topics    Alcohol use: No     Comment: former heavy drinker       No Known Allergies    Prior to Admission medications    Medication Sig Start Date End Date Taking? Authorizing Provider   lisinopril (PRINIVIL, ZESTRIL) 5 mg tablet TAKE 1 TABLET BY MOUTH EVERY DAY 19  Yes Eric Garcia MD   spironolactone (ALDACTONE) 25 mg tablet Take 1 Tab by mouth daily. 19  Yes Imani Liang NP   digoxin (LANOXIN) 0.125 mg tablet Take 1 Tab by mouth daily. 19  Yes Imani Liang NP   carvedilol (COREG) 3.125 mg tablet TAKE 1 TABLET BY MOUTH TWICE A DAY WITH MEALS 19  Yes Eric Garcia MD   simvastatin (ZOCOR) 40 mg tablet TAKE 1 TABLET BY MOUTH EVERY DAY AT NIGHT 19  Yes Eric Garcia MD   furosemide (LASIX) 20 mg tablet Take 40 mg by mouth daily. Yes Provider, Historical   aspirin 81 mg chewable tablet Take 1 Tab by mouth daily. 18  Yes Brady Irizarry MD   metFORMIN (GLUCOPHAGE) 1,000 mg tablet Take 1,000 mg by mouth two (2) times daily (with meals). Yes Provider, Historical   oxyCODONE-acetaminophen (PERCOCET) 5-325 mg per tablet Take 1 Tab by mouth every four (4) hours as needed for Pain. Max Daily Amount: 6 Tabs.  18   Danielito Schmidt MD             Visit Vitals  /63   Pulse 73 Ht 5' 9\" (1.753 m)   Wt 94.8 kg (209 lb)   BMI 30.86 kg/m²         Physical Exam   Constitutional: He is oriented to person, place, and time. He appears well-developed and well-nourished. HENT:   Head: Normocephalic and atraumatic. Left eye blindness   Eyes: Conjunctivae are normal.   Neck: Neck supple. No JVD present. No tracheal deviation present. No thyromegaly present. Cardiovascular: Normal rate, regular rhythm and normal heart sounds. Exam reveals no gallop and no friction rub. No murmur heard. Pulmonary/Chest: No respiratory distress. He has no wheezes. He has no rales. He exhibits no tenderness. Abdominal: Soft. There is no tenderness. Musculoskeletal: He exhibits no edema. Neurological: He is alert and oriented to person, place, and time. stuttering   Skin: Skin is warm and dry. Psychiatric: He has a normal mood and affect. Mr. Ashley Weinstein has a reminder for a \"due or due soon\" health maintenance. I have asked that he contact his primary care provider for follow-up on this health maintenance. 6/2018  I have personally reviewed patient's records available from hospital and other providers and incorporated findings in patient care. I have personally reviewed patients ekg done at other facility. I Have personally reviewed recent relevant labs available and discussed with patient  Interpretation Summary 8/2018  · Left ventricular severely decreased systolic function. Calculated left ventricular ejection fraction is 15%. Left ventricular cavity size is severely dilated. Left ventricular global hypokinesis. Severe (grade 3) left ventricular diastolic dysfunction. · Right ventricular cavity size is severely dilated. Right ventricular global systolic function is severely reduced. Right ventricular findings are consistent with Ramos's sign. · Right atrial cavity size is severely dilated. · Mild aortic valve sclerosis. · Mild mitral valve regurgitation.   · Mild tricuspid valve regurgitation is present. Pulmonary arterial systolic pressure is 53 mmHg. Moderate pulmonary hypertension is present. · Severely elevated central venous pressure (15+ mmHg); IVC diameter is larger than 21 mm and collapses less than 50% with respiration. · Mild pulmonic valve regurgitation is present. Procedure Conclusion   Nuclear Stress Test 8/2018  Abnormal myocardial perfusion imaging. Fixed defect consistent with prior myocardial infarction. Myocardial perfusion imaging supports a high risk stress test.   There is a prior study available for comparison. Moderate size moderate intensity fixed defect of inferior wall. This is associated with severe global hypokinesis and severely reduced systolic function. This could indicate area of infarction in inferior wall but more likely represents nonischemic cardiomyopathy      Interpretation Summary 8/2018  · Gated SPECT: Left ventricular function post-stress was abnormal. Calculated ejection fraction is 20%. · Baseline ECG: Normal sinus rhythm, non-specific ST-T wave abnormalities. · Inconclusive stress electrocardiogram.  · Myocardial perfusion imaging defect 1: There is a defect that is moderate in size with a moderate reduction in uptake present in the apical to basal inferior location(s) that is non-reversible. There is abnormal wall motion in the defect area. Viability in the area is poor. The defect appears to be infarction. · Abnormal myocardial perfusion imaging. Fixed defect consistent with prior myocardial infarction. Myocardial perfusion imaging supports a high risk stress test.         Conclusion 8/2018-cardiac catheterization     · Normal epicardial coronary arteries  · Normal filling pressures today with LVEDP 8, PCW 7 mean, RA mean 3 and RVEDP of 2      Medical treatment for nonischemic cardiomyopathy      10/2018  Echocardiogram exam  · Left ventricular severely decreased systolic function.  Estimated left ventricular ejection fraction is 21 - 25%. Left ventricular cavity size is dilated. Left ventricular global hypokinesis. Severe (grade 3) left ventricular diastolic dysfunction. · Left atrial cavity size is severely dilated. · Right ventricular cavity size is severely dilated. · Right atrial cavity size is moderately dilated. · Mild aortic valve focal thickening present. · Mild mitral valve regurgitation. · Mild tricuspid valve regurgitation is present. Mild pulmonary hypertension is present. Estimated pulmonary artery systolic pressure is 48 mmHg. · Mild pulmonic valve regurgitation is present. · Mildly elevated central venous pressure (5-10 mmHg); IVC diameter is less than 21 mm and collapses less than 50% with respiration. Assessment         ICD-10-CM ICD-9-CM    1. Systolic CHF, chronic ([facility]) I50.22 428.22      428.0     Stable compensated class II continue treatment   2. Cardiomyopathy, unspecified type ([facility] [address] I42.9 425.4     Nonischemic cardiomyopathy. Continue current therapy and monitor   3. Hyperlipidemia, unspecified hyperlipidemia type E78.5 272.4     Stable continue treatment monitor   4. Type 2 diabetes mellitus without complication, without long-term current use of insulin ([facility]) E11.9 250.00     Continue current treatment. Follow-up lab with PCP   7/2018  Symptoms suggestive of congestive heart failure. Continue with the Lasix. Have not added any ACE inhibitor due to low blood pressure. Will get echo and stress test.  Further plans based on above. 8/2018  Worsening congestive heart failure and cardiomyopathy. Abnormal nuclear scan. Will proceed with cardiac catheterization with possible PCI. Symptoms of CHF appears to be improving. Blood pressure low normal so I would not increase any medication at present. 9/2018  Nonischemic cardiomyopathy no significant CAD. CHF compensated class II-III. Due to low normal blood pressure will not increase any medication at this point.   Patient had nausea and vomiting after getting contrast injection. It has resolved. Will recheck echo and decide on ICD  11/2018  Cardiomyopathy remains severe. CHF class III. Patient has stopped all his cardiac medication. I have restarted and instructed on compliance. Discussed ICD. Wants To hold off at this time  2/2019  Nonischemic cardiomyopathy clinically compensated. Class II. Symptoms are improving. Does not want ICD at present  11/2019  Cardiac status stable. Compensated CHF. Does not want ICD. Does not want to change lisinopril to [facility]. Continue current therapy  There are no discontinued medications. No orders of the defined types were placed in this encounter. Follow-up and Dispositions    · Return in about 6 months (around 5/15/2020).

## 2019-12-06 RX ORDER — CARVEDILOL 3.12 MG/1
TABLET ORAL
Qty: 30 TAB | Refills: 6 | Status: SHIPPED | OUTPATIENT
Start: 2019-12-06 | End: 2020-04-16 | Stop reason: SDUPTHER

## 2019-12-16 RX ORDER — SIMVASTATIN 40 MG/1
TABLET, FILM COATED ORAL
Qty: 90 TAB | Refills: 1 | Status: SHIPPED | OUTPATIENT
Start: 2019-12-16 | End: 2022-05-12

## 2020-04-06 RX ORDER — CARVEDILOL 3.12 MG/1
12.5 TABLET ORAL 2 TIMES DAILY WITH MEALS
Qty: 60 TAB | Refills: 6 | OUTPATIENT
Start: 2020-04-06

## 2020-04-06 NOTE — TELEPHONE ENCOUNTER
Called patient to confirm the correct dose of Carvedilol, no answer. Left message to call back when possible.

## 2020-04-06 NOTE — TELEPHONE ENCOUNTER
Please confirm Coreg dose he is currently taking and re-enter.   Reads 3.125 mg - 4 tabs BID - if this is correct, please enter higher mg tabs  Thanks

## 2020-04-16 RX ORDER — CARVEDILOL 3.12 MG/1
TABLET ORAL
Qty: 60 TAB | Refills: 6 | Status: SHIPPED | OUTPATIENT
Start: 2020-04-16 | End: 2021-01-15 | Stop reason: SDUPTHER

## 2020-04-16 NOTE — TELEPHONE ENCOUNTER
Follow up is scheduled for May 29, 2020             Requested Prescriptions     Pending Prescriptions Disp Refills    carvediloL (COREG) 3.125 mg tablet 60 Tab 6     Sig: TAKE 1 TABLET BY MOUTH TWICE A DAY WITH MEALS

## 2020-05-01 RX ORDER — LISINOPRIL 5 MG/1
5 TABLET ORAL DAILY
Qty: 90 TAB | Refills: 1 | Status: SHIPPED | OUTPATIENT
Start: 2020-05-01 | End: 2020-08-24

## 2020-08-08 RX ORDER — DIGOXIN 125 MCG
TABLET ORAL
Qty: 90 TAB | Refills: 0 | Status: SHIPPED | OUTPATIENT
Start: 2020-08-08 | End: 2020-08-25

## 2020-08-24 RX ORDER — LISINOPRIL 5 MG/1
TABLET ORAL
Qty: 90 TAB | Refills: 1 | Status: SHIPPED | OUTPATIENT
Start: 2020-08-24 | End: 2022-08-18 | Stop reason: ALTCHOICE

## 2020-08-25 RX ORDER — DIGOXIN 125 MCG
TABLET ORAL
Qty: 90 TAB | Refills: 0 | Status: SHIPPED | OUTPATIENT
Start: 2020-08-25

## 2021-01-15 DIAGNOSIS — I50.22 SYSTOLIC CHF, CHRONIC (HCC): Primary | ICD-10-CM

## 2021-01-15 RX ORDER — CARVEDILOL 3.12 MG/1
TABLET ORAL
Qty: 60 TAB | Refills: 6 | Status: SHIPPED | OUTPATIENT
Start: 2021-01-15 | End: 2022-06-08

## 2021-01-15 NOTE — TELEPHONE ENCOUNTER
Requested Prescriptions     Pending Prescriptions Disp Refills    carvediloL (COREG) 3.125 mg tablet 60 Tab 6     Sig: TAKE 1 TABLET BY MOUTH TWICE A DAY WITH MEALS

## 2021-01-15 NOTE — TELEPHONE ENCOUNTER
Called and left a message for patient to call the office to schedule follow up appointment for refill of medication.

## 2021-02-03 RX ORDER — DIGOXIN 125 MCG
TABLET ORAL
Qty: 90 TAB | Refills: 3 | OUTPATIENT
Start: 2021-02-03

## 2021-02-03 NOTE — TELEPHONE ENCOUNTER
Left message on patients voicemail to call the office to make a follow up appointment so he is able to get his medication refilled.

## 2022-05-12 ENCOUNTER — OFFICE VISIT (OUTPATIENT)
Dept: CARDIOLOGY CLINIC | Age: 67
End: 2022-05-12
Payer: MEDICARE

## 2022-05-12 VITALS
HEART RATE: 90 BPM | WEIGHT: 222 LBS | HEIGHT: 69 IN | SYSTOLIC BLOOD PRESSURE: 118 MMHG | OXYGEN SATURATION: 98 % | BODY MASS INDEX: 32.88 KG/M2 | DIASTOLIC BLOOD PRESSURE: 73 MMHG

## 2022-05-12 DIAGNOSIS — I10 PRIMARY HYPERTENSION: ICD-10-CM

## 2022-05-12 DIAGNOSIS — Z79.4 TYPE 2 DIABETES MELLITUS WITH OTHER DIABETIC KIDNEY COMPLICATION, WITH LONG-TERM CURRENT USE OF INSULIN (HCC): ICD-10-CM

## 2022-05-12 DIAGNOSIS — I50.22 SYSTOLIC CHF, CHRONIC (HCC): Primary | ICD-10-CM

## 2022-05-12 DIAGNOSIS — E11.29 TYPE 2 DIABETES MELLITUS WITH OTHER DIABETIC KIDNEY COMPLICATION, WITH LONG-TERM CURRENT USE OF INSULIN (HCC): ICD-10-CM

## 2022-05-12 DIAGNOSIS — I42.9 CARDIOMYOPATHY, UNSPECIFIED TYPE (HCC): ICD-10-CM

## 2022-05-12 DIAGNOSIS — E78.5 HYPERLIPIDEMIA, UNSPECIFIED HYPERLIPIDEMIA TYPE: ICD-10-CM

## 2022-05-12 PROCEDURE — 99214 OFFICE O/P EST MOD 30 MIN: CPT | Performed by: INTERNAL MEDICINE

## 2022-05-12 RX ORDER — ROSUVASTATIN CALCIUM 10 MG/1
10 TABLET, COATED ORAL
COMMUNITY

## 2022-05-12 RX ORDER — INSULIN GLARGINE 100 [IU]/ML
INJECTION, SOLUTION SUBCUTANEOUS
COMMUNITY

## 2022-05-12 NOTE — PROGRESS NOTES
1. Have you been to the ER, urgent care clinic since your last visit? Hospitalized since your last visit?     no  2. Have you seen or consulted any other health care providers outside of the [address] [place] [address] since your last visit? Include any pap smears or colon screening.       Yes Where: pcp

## 2022-05-12 NOTE — PROGRESS NOTES
HISTORY OF PRESENT ILLNESS  Tierra Cervantes is a 78 y.o. male. 5/12/2022  Patient is reevaluated today. He was last seen in 2019. History of systolic CHF, CAD, hypertension and cardiomyopathy. Cardiomyopathy  The history is provided by the patient. This is a new problem. The problem occurs constantly. The problem has been gradually worsening. Associated symptoms include shortness of breath. Pertinent negatives include no chest pain, no abdominal pain and no headaches. Shortness of Breath  The history is provided by the patient. This is a new problem. The problem occurs intermittently. The problem has been gradually improving. Pertinent negatives include no fever, no headaches, no cough, no sputum production, no hemoptysis, no wheezing, no PND, no orthopnea, no chest pain, no vomiting, no abdominal pain, no rash, no leg swelling and no claudication. The problem's precipitants include exercise. Follow-up  Associated symptoms include shortness of breath. Pertinent negatives include no chest pain, no abdominal pain and no headaches. Review of Systems   Constitutional: Negative for chills and fever. HENT: Negative for nosebleeds. Eyes: Negative for blurred vision and double vision. Respiratory: Positive for shortness of breath. Negative for cough, hemoptysis, sputum production and wheezing. Cardiovascular: Negative for chest pain, palpitations, orthopnea, claudication, leg swelling and PND. Gastrointestinal: Negative for abdominal pain, heartburn, nausea and vomiting. Musculoskeletal: Negative for myalgias. Skin: Negative for rash. Neurological: Negative for dizziness, weakness and headaches. Endo/Heme/Allergies: Does not bruise/bleed easily.      Family History   Problem Relation Age of Onset    Heart Disease Mother     No Known Problems Father     Heart Disease Brother         enlarged heart       Past Medical History:   Diagnosis Date    Arthritis     Cardiomyopathy ([facility] [address] 2018    EF reported 45-50% in 2010    Chest pain 2018    Will anginal pain. Less likely GERD chest wall    Hyperlipidemia 2018    On statin. Lab with PCP    Nonspecific abnormal electrocardiogram (ECG) (EKG)     Obesity, unspecified     Other and unspecified hyperlipidemia     Other primary cardiomyopathies     Shortness of breath     SOB (shortness of breath) 2018    Possible CHF, HCVD, angina equivalent.  Systolic CHF, chronic ([facility] [address] 2018    class2-3     Type 2 diabetes mellitus without complication, without long-term current use of insulin ([facility] [address] 2018    On metformin. Lab with PCP    Type II or unspecified type diabetes mellitus without mention of complication, not stated as uncontrolled        No past surgical history on file. Social History     Tobacco Use    Smoking status: Former Smoker     Quit date: 1998     Years since quittin.4    Smokeless tobacco: Never Used   Substance Use Topics    Alcohol use: No     Comment: former heavy drinker       No Known Allergies    Prior to Admission medications    Medication Sig Start Date End Date Taking? Authorizing Provider   rosuvastatin (CRESTOR) 10 mg tablet Take 10 mg by mouth nightly. Yes Provider, Historical   insulin glargine (LANTUS,BASAGLAR) 100 unit/mL (3 mL) inpn by SubCUTAneous route. Yes Provider, Historical   carvediloL (COREG) 3.125 mg tablet TAKE 1 TABLET BY MOUTH TWICE A DAY WITH MEALS 1/15/21  Yes Tanvi Alexander NP   digoxin (LANOXIN) 0.125 mg tablet TAKE 1 TABLET BY MOUTH EVERY DAY 20  Yes Nazario Voss MD   lisinopriL (PRINIVIL, ZESTRIL) 5 mg tablet TAKE 1 TABLET BY MOUTH EVERY DAY 20  Yes Abdulkadir Cheney MD   spironolactone (ALDACTONE) 25 mg tablet Take 1 Tab by mouth daily. 19  Yes Imani Liang NP   furosemide (Lasix) 40 mg tablet Take 40 mg by mouth daily. Yes Provider, Historical   aspirin 81 mg chewable tablet Take 1 Tab by mouth daily.  18 Yes Debo Mata MD   metFORMIN (GLUCOPHAGE) 1,000 mg tablet Take 1,000 mg by mouth two (2) times daily (with meals). Yes Provider, Historical   oxyCODONE-acetaminophen (PERCOCET) 5-325 mg per tablet Take 1 Tab by mouth every four (4) hours as needed for Pain. Max Daily Amount: 6 Tabs. Patient not taking: Reported on 5/12/2022 8/29/18   Reid Chapa MD             Visit Vitals  /73   Pulse 90   Ht 5' 9\" (1.753 m)   Wt 100.7 kg (222 lb)   SpO2 98%   BMI 32.78 kg/m²         Physical Exam  Constitutional:       Appearance: He is well-developed. HENT:      Head: Normocephalic and atraumatic. Eyes:      Conjunctiva/sclera: Conjunctivae normal.   Neck:      Thyroid: No thyromegaly. Vascular: No JVD. Trachea: No tracheal deviation. Cardiovascular:      Rate and Rhythm: Normal rate and regular rhythm. Heart sounds: Normal heart sounds. No murmur heard. No friction rub. No gallop. Pulmonary:      Effort: No respiratory distress. Breath sounds: No wheezing or rales. Chest:      Chest wall: No tenderness. Abdominal:      Palpations: Abdomen is soft. Tenderness: There is no abdominal tenderness. Musculoskeletal:      Cervical back: Neck supple. Skin:     General: Skin is warm and dry. Neurological:      Mental Status: He is alert and oriented to person, place, and time. Comments: stuttering         Mr. Iggy Hodge has a reminder for a \"due or due soon\" health maintenance. I have asked that he contact his primary care provider for follow-up on this health maintenance. 6/2018  I have personally reviewed patient's records available from hospital and other providers and incorporated findings in patient care. I have personally reviewed patients ekg done at other facility. I Have personally reviewed recent relevant labs available and discussed with patient  Interpretation Summary 8/2018  · Left ventricular severely decreased systolic function.  Calculated left ventricular ejection fraction is 15%. Left ventricular cavity size is severely dilated. Left ventricular global hypokinesis. Severe (grade 3) left ventricular diastolic dysfunction. · Right ventricular cavity size is severely dilated. Right ventricular global systolic function is severely reduced. Right ventricular findings are consistent with Ramos's sign. · Right atrial cavity size is severely dilated. · Mild aortic valve sclerosis. · Mild mitral valve regurgitation. · Mild tricuspid valve regurgitation is present. Pulmonary arterial systolic pressure is 53 mmHg. Moderate pulmonary hypertension is present. · Severely elevated central venous pressure (15+ mmHg); IVC diameter is larger than 21 mm and collapses less than 50% with respiration. · Mild pulmonic valve regurgitation is present. Procedure Conclusion   Nuclear Stress Test 8/2018  Abnormal myocardial perfusion imaging. Fixed defect consistent with prior myocardial infarction. Myocardial perfusion imaging supports a high risk stress test.   There is a prior study available for comparison. Moderate size moderate intensity fixed defect of inferior wall. This is associated with severe global hypokinesis and severely reduced systolic function. This could indicate area of infarction in inferior wall but more likely represents nonischemic cardiomyopathy      Interpretation Summary 8/2018  · Gated SPECT: Left ventricular function post-stress was abnormal. Calculated ejection fraction is 20%. · Baseline ECG: Normal sinus rhythm, non-specific ST-T wave abnormalities. · Inconclusive stress electrocardiogram.  · Myocardial perfusion imaging defect 1: There is a defect that is moderate in size with a moderate reduction in uptake present in the apical to basal inferior location(s) that is non-reversible. There is abnormal wall motion in the defect area. Viability in the area is poor. The defect appears to be infarction.   · Abnormal myocardial perfusion imaging. Fixed defect consistent with prior myocardial infarction. Myocardial perfusion imaging supports a high risk stress test.         Conclusion 8/2018-cardiac catheterization     · Normal epicardial coronary arteries  · Normal filling pressures today with LVEDP 8, PCW 7 mean, RA mean 3 and RVEDP of 2      Medical treatment for nonischemic cardiomyopathy      10/2018  Echocardiogram exam  · Left ventricular severely decreased systolic function. Estimated left ventricular ejection fraction is 21 - 25%. Left ventricular cavity size is dilated. Left ventricular global hypokinesis. Severe (grade 3) left ventricular diastolic dysfunction. · Left atrial cavity size is severely dilated. · Right ventricular cavity size is severely dilated. · Right atrial cavity size is moderately dilated. · Mild aortic valve focal thickening present. · Mild mitral valve regurgitation. · Mild tricuspid valve regurgitation is present. Mild pulmonary hypertension is present. Estimated pulmonary artery systolic pressure is 48 mmHg. · Mild pulmonic valve regurgitation is present. · Mildly elevated central venous pressure (5-10 mmHg); IVC diameter is less than 21 mm and collapses less than 50% with respiration. Assessment         ICD-10-CM ICD-9-CM    1. Systolic CHF, chronic ([facility])  I50.22 428.22 ECHO ADULT COMPLETE     428.0 DIGOXIN    Stable compensated class II.   2. Hyperlipidemia, unspecified hyperlipidemia type  E78.5 272.4     Continue treatment lab with PCP   3. Cardiomyopathy, unspecified type ([facility] [address]  I42.9 425.4 ECHO ADULT COMPLETE      DIGOXIN    Continue treatment check labs. 4. Primary hypertension  I10 401.9     Stable monitor   5. Type 2 diabetes mellitus with other diabetic kidney complication, with long-term current use of insulin ([facility])  E11.29 250.40     Z79.4 V58.67     Currently on insulin. Has proteinuria on last lab   7/2018  Symptoms suggestive of congestive heart failure. Continue with the Lasix.   Have not added any ACE inhibitor due to low blood pressure. Will get echo and stress test.  Further plans based on above. 8/2018  Worsening congestive heart failure and cardiomyopathy. Abnormal nuclear scan. Will proceed with cardiac catheterization with possible PCI. Symptoms of CHF appears to be improving. Blood pressure low normal so I would not increase any medication at present. 9/2018  Nonischemic cardiomyopathy no significant CAD. CHF compensated class II-III. Due to low normal blood pressure will not increase any medication at this point. Patient had nausea and vomiting after getting contrast injection. It has resolved. Will recheck echo and decide on ICD  11/2018  Cardiomyopathy remains severe. CHF class III. Patient has stopped all his cardiac medication. I have restarted and instructed on compliance. Discussed ICD. Wants To hold off at this time  2/2019  Nonischemic cardiomyopathy clinically compensated. Class II. Symptoms are improving. Does not want ICD at present  11/2019  Cardiac status stable. Compensated CHF. Does not want ICD. Does not want to change lisinopril to Nish Sinning. Continue current therapy  5/2022  Patient reevaluated today. Last seen in 2019. We will recheck echo. Check digoxin level. At present does not want ICD. Consider Entresto  Medications Discontinued During This Encounter   Medication Reason    simvastatin (ZOCOR) 40 mg tablet Not A Current Medication       Orders Placed This Encounter    DIGOXIN     Standing Status:   Future     Standing Expiration Date:   11/12/2022    ECHO ADULT COMPLETE     Standing Status:   Future     Standing Expiration Date:   5/12/2023     Order Specific Question:   Contrast Enhancement (Bubble Study, Definity, Optison) may be used if criteria listed in established evidence-based protocol has been identified. Answer:   Yes       Follow-up and Dispositions    · Return for F/u after tests, Follow-up with Imani.

## 2022-05-14 LAB — DIGOXIN SERPL-MCNC: <0.4 NG/ML (ref 0.5–0.9)

## 2022-05-16 ENCOUNTER — TELEPHONE (OUTPATIENT)
Dept: CARDIOLOGY CLINIC | Age: 67
End: 2022-05-16

## 2022-05-16 NOTE — TELEPHONE ENCOUNTER
----- Message from Dre Chavez MD sent at 5/16/2022  7:19 AM EDT -----  Digoxin level is low but he is asymptomatic continue current treatment

## 2022-05-16 NOTE — TELEPHONE ENCOUNTER
Called and left message with patient per Dr. Janusz Hubbard. Lab reviewed. Digoxin level is low but he is asymptomatic continue current treatment. If any questions to call office.

## 2022-06-08 ENCOUNTER — OFFICE VISIT (OUTPATIENT)
Dept: CARDIOLOGY CLINIC | Age: 67
End: 2022-06-08
Payer: MEDICARE

## 2022-06-08 VITALS
BODY MASS INDEX: 32.58 KG/M2 | HEIGHT: 69 IN | DIASTOLIC BLOOD PRESSURE: 76 MMHG | SYSTOLIC BLOOD PRESSURE: 134 MMHG | OXYGEN SATURATION: 96 % | HEART RATE: 79 BPM | WEIGHT: 220 LBS

## 2022-06-08 DIAGNOSIS — I42.9 CARDIOMYOPATHY, UNSPECIFIED TYPE (HCC): ICD-10-CM

## 2022-06-08 DIAGNOSIS — I50.22 SYSTOLIC CHF, CHRONIC (HCC): ICD-10-CM

## 2022-06-08 DIAGNOSIS — I10 PRIMARY HYPERTENSION: ICD-10-CM

## 2022-06-08 DIAGNOSIS — Z79.4 TYPE 2 DIABETES MELLITUS WITH OTHER DIABETIC KIDNEY COMPLICATION, WITH LONG-TERM CURRENT USE OF INSULIN (HCC): ICD-10-CM

## 2022-06-08 DIAGNOSIS — E78.5 HYPERLIPIDEMIA, UNSPECIFIED HYPERLIPIDEMIA TYPE: Primary | ICD-10-CM

## 2022-06-08 DIAGNOSIS — E11.29 TYPE 2 DIABETES MELLITUS WITH OTHER DIABETIC KIDNEY COMPLICATION, WITH LONG-TERM CURRENT USE OF INSULIN (HCC): ICD-10-CM

## 2022-06-08 PROCEDURE — G8536 NO DOC ELDER MAL SCRN: HCPCS | Performed by: NURSE PRACTITIONER

## 2022-06-08 PROCEDURE — 1101F PT FALLS ASSESS-DOCD LE1/YR: CPT | Performed by: NURSE PRACTITIONER

## 2022-06-08 PROCEDURE — 1123F ACP DISCUSS/DSCN MKR DOCD: CPT | Performed by: NURSE PRACTITIONER

## 2022-06-08 PROCEDURE — 3046F HEMOGLOBIN A1C LEVEL >9.0%: CPT | Performed by: NURSE PRACTITIONER

## 2022-06-08 PROCEDURE — G8432 DEP SCR NOT DOC, RNG: HCPCS | Performed by: NURSE PRACTITIONER

## 2022-06-08 PROCEDURE — 3017F COLORECTAL CA SCREEN DOC REV: CPT | Performed by: NURSE PRACTITIONER

## 2022-06-08 PROCEDURE — G8427 DOCREV CUR MEDS BY ELIG CLIN: HCPCS | Performed by: NURSE PRACTITIONER

## 2022-06-08 PROCEDURE — G8417 CALC BMI ABV UP PARAM F/U: HCPCS | Performed by: NURSE PRACTITIONER

## 2022-06-08 PROCEDURE — G8754 DIAS BP LESS 90: HCPCS | Performed by: NURSE PRACTITIONER

## 2022-06-08 PROCEDURE — G8752 SYS BP LESS 140: HCPCS | Performed by: NURSE PRACTITIONER

## 2022-06-08 PROCEDURE — 99214 OFFICE O/P EST MOD 30 MIN: CPT | Performed by: NURSE PRACTITIONER

## 2022-06-08 PROCEDURE — 2022F DILAT RTA XM EVC RTNOPTHY: CPT | Performed by: NURSE PRACTITIONER

## 2022-06-08 RX ORDER — CARVEDILOL 6.25 MG/1
TABLET ORAL
Qty: 60 TABLET | Refills: 5 | Status: SHIPPED | OUTPATIENT
Start: 2022-06-08

## 2022-06-08 NOTE — PATIENT INSTRUCTIONS
Heart-Healthy Diet: Care Instructions  Your Care Instructions     A heart-healthy diet has lots of vegetables, fruits, nuts, beans, and whole grains, and is low in salt. It limits foods that are high in saturated fat, such as meats, cheeses, and fried foods. It may be hard to change your diet, but even small changes can lower your risk of heart attack and heart disease. Follow-up care is a key part of your treatment and safety. Be sure to make and go to all appointments, and call your doctor if you are having problems. It's also a good idea to know your test results and keep a list of the medicines you take. How can you care for yourself at home? Watch your portions  · Use food labels to learn what the recommended servings are for the foods you eat. · Eat only the number of calories you need to stay at a healthy weight. If you need to lose weight, eat fewer calories than your body burns (through exercise and other physical activity). Eat more fruits and vegetables  · Eat a variety of fruit and vegetables every day. Dark green, deep orange, red, or yellow fruits and vegetables are especially good for you. Examples include spinach, carrots, peaches, and berries. · Keep carrots, celery, and other veggies handy for snacks. Buy fruit that is in season and store it where you can see it so that you will be tempted to eat it. · Cook dishes that have a lot of veggies in them, such as stir-fries and soups. Limit saturated fat  · Read food labels, and try to avoid saturated fats. They increase your risk of heart disease. · Use olive or canola oil when you cook. · Bake, broil, grill, or steam foods instead of frying them. · Choose lean meats instead of high-fat meats such as hot dogs and sausages. Cut off all visible fat when you prepare meat. · Eat fish, skinless poultry, and meat alternatives such as soy products instead of high-fat meats.  Soy products, such as tofu, may be especially good for your heart.  · Choose low-fat or fat-free milk and dairy products. Eat foods high in fiber  · Eat a variety of grain products every day. Include whole-grain foods that have lots of fiber and nutrients. Examples of whole-grain foods include oats, whole wheat bread, and brown rice. · Buy whole-grain breads and cereals, instead of white bread or pastries. Limit salt and sodium  · Limit how much salt and sodium you eat to help lower your blood pressure. · Taste food before you salt it. Add only a little salt when you think you need it. With time, your taste buds will adjust to less salt. · Eat fewer snack items, fast foods, and other high-salt, processed foods. Check food labels for the amount of sodium in packaged foods. · Choose low-sodium versions of canned goods (such as soups, vegetables, and beans). Limit sugar  · Limit drinks and foods with added sugar. These include candy, desserts, and soda pop. Limit alcohol  · Limit alcohol to no more than 2 drinks a day for men and 1 drink a day for women. Too much alcohol can cause health problems. When should you call for help? Watch closely for changes in your health, and be sure to contact your doctor if:    · You would like help planning heart-healthy meals. Where can you learn more? Go to http://www.davis.com/  Enter V137 in the search box to learn more about \"Heart-Healthy Diet: Care Instructions. \"  Current as of: September 8, 2021               Content Version: 13.2  © 2006-2022 [employer]. Care instructions adapted under license by [employer] (which disclaims liability or warranty for this information). If you have questions about a medical condition or this instruction, always ask your healthcare professional. [place] [address] any warranty or liability for your use of this information.

## 2022-06-08 NOTE — PROGRESS NOTES
HISTORY OF PRESENT ILLNESS  Pricilla Hilario is a 78 y.o. male. 5/12/2022  Patient is reevaluated today. He was last seen in 2019. History of systolic CHF, CAD, hypertension and cardiomyopathy. 6/2022  Patient seen in f/u for testing results. He denies chest pain, shortness of breath, palpitations or edema. Follow-up  Associated symptoms include shortness of breath. Pertinent negatives include no chest pain, no abdominal pain and no headaches. Cardiomyopathy  The history is provided by the patient. This is a new problem. The problem occurs constantly. The problem has been gradually worsening. Associated symptoms include shortness of breath. Pertinent negatives include no chest pain, no abdominal pain and no headaches. Shortness of Breath  The history is provided by the patient. This is a new problem. The problem occurs intermittently. The problem has been gradually improving. Pertinent negatives include no fever, no headaches, no cough, no sputum production, no hemoptysis, no wheezing, no PND, no orthopnea, no chest pain, no vomiting, no abdominal pain, no rash, no leg swelling and no claudication. The problem's precipitants include exercise. Review of Systems   Constitutional: Negative for chills and fever. HENT: Negative for nosebleeds. Eyes: Negative for blurred vision and double vision. Respiratory: Positive for shortness of breath. Negative for cough, hemoptysis, sputum production and wheezing. Cardiovascular: Negative for chest pain, palpitations, orthopnea, claudication, leg swelling and PND. Gastrointestinal: Negative for abdominal pain, heartburn, nausea and vomiting. Musculoskeletal: Negative for myalgias. Skin: Negative for rash. Neurological: Negative for dizziness, weakness and headaches. Endo/Heme/Allergies: Does not bruise/bleed easily.      Family History   Problem Relation Age of Onset    Heart Disease Mother     No Known Problems Father     Heart Disease Brother         enlarged heart       Past Medical History:   Diagnosis Date    Arthritis     Cardiomyopathy ([facility] 2018    EF reported 45-50% in 2010    Chest pain 2018    Will anginal pain. Less likely GERD chest wall    Hyperlipidemia 2018    On statin. Lab with PCP    Nonspecific abnormal electrocardiogram (ECG) (EKG)     Obesity, unspecified     Other and unspecified hyperlipidemia     Other primary cardiomyopathies     Shortness of breath     SOB (shortness of breath) 2018    Possible CHF, HCVD, angina equivalent.  Systolic CHF, chronic ([facility] 2018    class2-3     Type 2 diabetes mellitus without complication, without long-term current use of insulin ([facility] 2018    On metformin. Lab with PCP    Type II or unspecified type diabetes mellitus without mention of complication, not stated as uncontrolled        History reviewed. No pertinent surgical history. Social History     Tobacco Use    Smoking status: Former Smoker     Quit date: 1998     Years since quittin.5    Smokeless tobacco: Never Used   Substance Use Topics    Alcohol use: No     Comment: former heavy drinker       No Known Allergies    Prior to Admission medications    Medication Sig Start Date End Date Taking? Authorizing Provider   INSULIN DEGLUDEC SC by SubCUTAneous route. Yes Provider, Historical   carvediloL (COREG) 6.25 mg tablet TAKE 1 TABLET BY MOUTH TWICE A DAY WITH MEALS 22  Yes Imani Liang NP   rosuvastatin (CRESTOR) 10 mg tablet Take 10 mg by mouth nightly. Yes Provider, Historical   insulin glargine (LANTUS,BASAGLAR) 100 unit/mL (3 mL) inpn by SubCUTAneous route.    Yes Provider, Historical   digoxin (LANOXIN) 0.125 mg tablet TAKE 1 TABLET BY MOUTH EVERY DAY 20  Yes Nestor Ambriz MD   lisinopriL (PRINIVIL, ZESTRIL) 5 mg tablet TAKE 1 TABLET BY MOUTH EVERY DAY 20  Yes Rocio Root MD   spironolactone (ALDACTONE) 25 mg tablet Take 1 Tab by mouth daily. 11/4/19  Yes Imani Liang NP   furosemide (Lasix) 40 mg tablet Take 40 mg by mouth daily. Yes Provider, Historical   aspirin 81 mg chewable tablet Take 1 Tab by mouth daily. 6/22/18  Yes Delilah Valle MD   metFORMIN (GLUCOPHAGE) 1,000 mg tablet Take 1,000 mg by mouth two (2) times daily (with meals). Yes Provider, Historical   carvediloL (COREG) 3.125 mg tablet TAKE 1 TABLET BY MOUTH TWICE A DAY WITH MEALS 1/15/21 6/8/22  Tanvi Alexander NP   oxyCODONE-acetaminophen (PERCOCET) 5-325 mg per tablet Take 1 Tab by mouth every four (4) hours as needed for Pain. Max Daily Amount: 6 Tabs. Patient not taking: Reported on 5/12/2022 8/29/18 6/8/22  Kim MD Jelly             Visit Vitals  /76 (BP 1 Location: Left upper arm, BP Patient Position: Sitting, BP Cuff Size: Adult)   Pulse 79   Ht 5' 9\" (1.753 m)   Wt 99.8 kg (220 lb)   SpO2 96%   BMI 32.49 kg/m²         Physical Exam  Constitutional:       Appearance: He is well-developed. HENT:      Head: Normocephalic and atraumatic. Eyes:      Conjunctiva/sclera: Conjunctivae normal.   Neck:      Thyroid: No thyromegaly. Vascular: No JVD. Trachea: No tracheal deviation. Cardiovascular:      Rate and Rhythm: Normal rate and regular rhythm. Heart sounds: Normal heart sounds. No murmur heard. No friction rub. No gallop. Pulmonary:      Effort: No respiratory distress. Breath sounds: No wheezing or rales. Chest:      Chest wall: No tenderness. Abdominal:      Palpations: Abdomen is soft. Tenderness: There is no abdominal tenderness. Musculoskeletal:      Cervical back: Neck supple. Skin:     General: Skin is warm and dry. Neurological:      Mental Status: He is alert and oriented to person, place, and time. Comments: stuttering         Mr. eFla Gee has a reminder for a \"due or due soon\" health maintenance.  I have asked that he contact his primary care provider for follow-up on this health maintenance. 6/2018  I have personally reviewed patient's records available from hospital and other providers and incorporated findings in patient care. I have personally reviewed patients ekg done at other facility. I Have personally reviewed recent relevant labs available and discussed with patient  Interpretation Summary 8/2018  · Left ventricular severely decreased systolic function. Calculated left ventricular ejection fraction is 15%. Left ventricular cavity size is severely dilated. Left ventricular global hypokinesis. Severe (grade 3) left ventricular diastolic dysfunction. · Right ventricular cavity size is severely dilated. Right ventricular global systolic function is severely reduced. Right ventricular findings are consistent with Ramos's sign. · Right atrial cavity size is severely dilated. · Mild aortic valve sclerosis. · Mild mitral valve regurgitation. · Mild tricuspid valve regurgitation is present. Pulmonary arterial systolic pressure is 53 mmHg. Moderate pulmonary hypertension is present. · Severely elevated central venous pressure (15+ mmHg); IVC diameter is larger than 21 mm and collapses less than 50% with respiration. · Mild pulmonic valve regurgitation is present. Procedure Conclusion   Nuclear Stress Test 8/2018  Abnormal myocardial perfusion imaging. Fixed defect consistent with prior myocardial infarction. Myocardial perfusion imaging supports a high risk stress test.   There is a prior study available for comparison. Moderate size moderate intensity fixed defect of inferior wall. This is associated with severe global hypokinesis and severely reduced systolic function. This could indicate area of infarction in inferior wall but more likely represents nonischemic cardiomyopathy      Interpretation Summary 8/2018  · Gated SPECT: Left ventricular function post-stress was abnormal. Calculated ejection fraction is 20%.   · Baseline ECG: Normal sinus rhythm, non-specific ST-T wave abnormalities. · Inconclusive stress electrocardiogram.  · Myocardial perfusion imaging defect 1: There is a defect that is moderate in size with a moderate reduction in uptake present in the apical to basal inferior location(s) that is non-reversible. There is abnormal wall motion in the defect area. Viability in the area is poor. The defect appears to be infarction. · Abnormal myocardial perfusion imaging. Fixed defect consistent with prior myocardial infarction. Myocardial perfusion imaging supports a high risk stress test.         Conclusion 8/2018-cardiac catheterization     · Normal epicardial coronary arteries  · Normal filling pressures today with LVEDP 8, PCW 7 mean, RA mean 3 and RVEDP of 2      Medical treatment for nonischemic cardiomyopathy      10/2018  Echocardiogram exam  · Left ventricular severely decreased systolic function. Estimated left ventricular ejection fraction is 21 - 25%. Left ventricular cavity size is dilated. Left ventricular global hypokinesis. Severe (grade 3) left ventricular diastolic dysfunction. · Left atrial cavity size is severely dilated. · Right ventricular cavity size is severely dilated. · Right atrial cavity size is moderately dilated. · Mild aortic valve focal thickening present. · Mild mitral valve regurgitation. · Mild tricuspid valve regurgitation is present. Mild pulmonary hypertension is present. Estimated pulmonary artery systolic pressure is 48 mmHg. · Mild pulmonic valve regurgitation is present. · Mildly elevated central venous pressure (5-10 mmHg); IVC diameter is less than 21 mm and collapses less than 50% with respiration. 5/2022  Echo  Interpretation Summary    Left Ventricle: Severely reduced left ventricular systolic function with a visually estimated EF of 20 - 25%. Left ventricle size is normal. Normal wall thickness. Global hypokinesis present. Normal diastolic function.   Right Ventricle: Reduced systolic function.     Aorta: Mildly dilated aortic root. Comparison Study Information  Prior Study  There is a prior study available for comparison. Prior study date: 10/26/2018. Assessment         ICD-10-CM ICD-9-CM    1. Hyperlipidemia, unspecified hyperlipidemia type  E78.5 272.4     Continue statin, lab with PCP   2. Systolic CHF, chronic (HCC)  I50.22 428.22 carvediloL (COREG) 6.25 mg tablet     428.0     Stable compensated class II   3. Cardiomyopathy, unspecified type ([facility]  I42.9 425.4     Continue treatment check labs. 4. Primary hypertension  I10 401.9     Stable monitor   5. Type 2 diabetes mellitus with other diabetic kidney complication, with long-term current use of insulin (HCC)  E11.29 250.40     Z79.4 V58.67     Currently on insulin - follow with PCP   6. Systolic CHF, chronic (HCC)  I50.22 428.22 carvediloL (COREG) 6.25 mg tablet     428.0    7/2018  Symptoms suggestive of congestive heart failure. Continue with the Lasix. Have not added any ACE inhibitor due to low blood pressure. Will get echo and stress test.  Further plans based on above. 8/2018  Worsening congestive heart failure and cardiomyopathy. Abnormal nuclear scan. Will proceed with cardiac catheterization with possible PCI. Symptoms of CHF appears to be improving. Blood pressure low normal so I would not increase any medication at present. 9/2018  Nonischemic cardiomyopathy no significant CAD. CHF compensated class II-III. Due to low normal blood pressure will not increase any medication at this point. Patient had nausea and vomiting after getting contrast injection. It has resolved. Will recheck echo and decide on ICD  11/2018  Cardiomyopathy remains severe. CHF class III. Patient has stopped all his cardiac medication. I have restarted and instructed on compliance. Discussed ICD. Wants To hold off at this time  2/2019  Nonischemic cardiomyopathy clinically compensated. Class II. Symptoms are improving.   Does not want ICD at present  11/2019  Cardiac status stable. Compensated CHF. Does not want ICD. Does not want to change lisinopril to [facility]. Continue current therapy  5/2022  Patient reevaluated today. Last seen in 2019. We will recheck echo. Check digoxin level. At present does not want ICD. Consider [facility]  6/2022  Patient seen in follow-up for history of cardiomyopathy. CHF appears clinically compensated echo reviewed and discussed with patient LV function 20-25%. Discussed ICD, patient continues to not want at this time. Will increase carvedilol to 6.25 mg twice daily. Follow-up in 2 weeks we will consider adding Entresto at that time. Digoxin level is low however patient is asymptomatic we will continue all other medications  Medications Discontinued During This Encounter   Medication Reason    oxyCODONE-acetaminophen (PERCOCET) 5-325 mg per tablet Therapy Completed    carvediloL (COREG) 3.125 mg tablet        Orders Placed This Encounter    carvediloL (COREG) 6.25 mg tablet     Sig: TAKE 1 TABLET BY MOUTH TWICE A DAY WITH MEALS     Dispense:  60 Tablet     Refill:  5       Follow-up and Dispositions    · Return in about 2 weeks (around 6/22/2022) for Follow up with Dr. Nettie Almendarez.

## 2022-06-08 NOTE — PROGRESS NOTES
1. Have you been to the ER, urgent care clinic since your last visit? Hospitalized since your last visit? No    2. Have you seen or consulted any other health care providers outside of the [address] [place] [address] since your last visit? Include any pap smears or colon screening.       No

## 2022-08-18 ENCOUNTER — OFFICE VISIT (OUTPATIENT)
Dept: CARDIOLOGY CLINIC | Age: 67
End: 2022-08-18
Payer: MEDICARE

## 2022-08-18 VITALS
DIASTOLIC BLOOD PRESSURE: 82 MMHG | OXYGEN SATURATION: 99 % | BODY MASS INDEX: 32.44 KG/M2 | HEART RATE: 80 BPM | SYSTOLIC BLOOD PRESSURE: 136 MMHG | HEIGHT: 69 IN | WEIGHT: 219 LBS

## 2022-08-18 DIAGNOSIS — I10 PRIMARY HYPERTENSION: ICD-10-CM

## 2022-08-18 DIAGNOSIS — I42.9 CARDIOMYOPATHY, UNSPECIFIED TYPE (HCC): ICD-10-CM

## 2022-08-18 DIAGNOSIS — E78.5 HYPERLIPIDEMIA, UNSPECIFIED HYPERLIPIDEMIA TYPE: ICD-10-CM

## 2022-08-18 DIAGNOSIS — I50.22 SYSTOLIC CHF, CHRONIC (HCC): Primary | ICD-10-CM

## 2022-08-18 DIAGNOSIS — E11.29 TYPE 2 DIABETES MELLITUS WITH OTHER DIABETIC KIDNEY COMPLICATION, WITH LONG-TERM CURRENT USE OF INSULIN (HCC): ICD-10-CM

## 2022-08-18 DIAGNOSIS — Z79.4 TYPE 2 DIABETES MELLITUS WITH OTHER DIABETIC KIDNEY COMPLICATION, WITH LONG-TERM CURRENT USE OF INSULIN (HCC): ICD-10-CM

## 2022-08-18 PROCEDURE — 1101F PT FALLS ASSESS-DOCD LE1/YR: CPT | Performed by: INTERNAL MEDICINE

## 2022-08-18 PROCEDURE — 3017F COLORECTAL CA SCREEN DOC REV: CPT | Performed by: INTERNAL MEDICINE

## 2022-08-18 PROCEDURE — G8417 CALC BMI ABV UP PARAM F/U: HCPCS | Performed by: INTERNAL MEDICINE

## 2022-08-18 PROCEDURE — 2022F DILAT RTA XM EVC RTNOPTHY: CPT | Performed by: INTERNAL MEDICINE

## 2022-08-18 PROCEDURE — G8752 SYS BP LESS 140: HCPCS | Performed by: INTERNAL MEDICINE

## 2022-08-18 PROCEDURE — G8536 NO DOC ELDER MAL SCRN: HCPCS | Performed by: INTERNAL MEDICINE

## 2022-08-18 PROCEDURE — 1123F ACP DISCUSS/DSCN MKR DOCD: CPT | Performed by: INTERNAL MEDICINE

## 2022-08-18 PROCEDURE — G8427 DOCREV CUR MEDS BY ELIG CLIN: HCPCS | Performed by: INTERNAL MEDICINE

## 2022-08-18 PROCEDURE — G8754 DIAS BP LESS 90: HCPCS | Performed by: INTERNAL MEDICINE

## 2022-08-18 PROCEDURE — 99214 OFFICE O/P EST MOD 30 MIN: CPT | Performed by: INTERNAL MEDICINE

## 2022-08-18 PROCEDURE — G8432 DEP SCR NOT DOC, RNG: HCPCS | Performed by: INTERNAL MEDICINE

## 2022-08-18 PROCEDURE — 3046F HEMOGLOBIN A1C LEVEL >9.0%: CPT | Performed by: INTERNAL MEDICINE

## 2022-08-18 RX ORDER — SACUBITRIL AND VALSARTAN 24; 26 MG/1; MG/1
1 TABLET, FILM COATED ORAL 2 TIMES DAILY
Qty: 180 TABLET | Refills: 3 | Status: SHIPPED | OUTPATIENT
Start: 2022-08-18

## 2022-08-18 NOTE — PROGRESS NOTES
HISTORY OF PRESENT ILLNESS  America Posada is a 78 y.o. male. 5/12/2022  Patient is reevaluated today. He was last seen in 2019. History of systolic CHF, CAD, hypertension and cardiomyopathy. 6/2022  Patient seen in f/u for testing results. He denies chest pain, shortness of breath, palpitations or edema. Follow-up  Associated symptoms include shortness of breath. Pertinent negatives include no chest pain, no abdominal pain and no headaches. Cardiomyopathy  The history is provided by the Patient. This is a new problem. The problem occurs constantly. The problem has been gradually worsening. Associated symptoms include shortness of breath. Pertinent negatives include no chest pain, no abdominal pain and no headaches. Shortness of Breath  The history is provided by the Patient. This is a new problem. The problem occurs intermittently. The problem has been gradually improving. Pertinent negatives include no fever, no headaches, no cough, no sputum production, no hemoptysis, no wheezing, no PND, no orthopnea, no chest pain, no vomiting, no abdominal pain, no rash, no leg swelling and no claudication. The problem's precipitants include exercise. Review of Systems   Constitutional:  Negative for chills and fever. HENT:  Negative for nosebleeds. Eyes:  Negative for blurred vision and double vision. Respiratory:  Positive for shortness of breath. Negative for cough, hemoptysis, sputum production and wheezing. Cardiovascular:  Negative for chest pain, palpitations, orthopnea, claudication, leg swelling and PND. Gastrointestinal:  Negative for abdominal pain, heartburn, nausea and vomiting. Musculoskeletal:  Negative for myalgias. Skin:  Negative for rash. Neurological:  Negative for dizziness, weakness and headaches. Endo/Heme/Allergies:  Does not bruise/bleed easily.    Family History   Problem Relation Age of Onset    Heart Disease Mother     No Known Problems Father     Heart Disease Brother         enlarged heart       Past Medical History:   Diagnosis Date    Arthritis     Cardiomyopathy ([facility] [address] 2018    EF reported 45-50% in 2010    Chest pain 2018    Will anginal pain. Less likely GERD chest wall    Hyperlipidemia 2018    On statin. Lab with PCP    Nonspecific abnormal electrocardiogram (ECG) (EKG)     Obesity, unspecified     Other and unspecified hyperlipidemia     Other primary cardiomyopathies     Shortness of breath     SOB (shortness of breath) 2018    Possible CHF, HCVD, angina equivalent. Systolic CHF, chronic ([facility] [address] 2018    class2-3     Type 2 diabetes mellitus without complication, without long-term current use of insulin ([facility] [address] 2018    On metformin. Lab with PCP    Type II or unspecified type diabetes mellitus without mention of complication, not stated as uncontrolled        No past surgical history on file. Social History     Tobacco Use    Smoking status: Former     Types: Cigarettes     Quit date: 1998     Years since quittin.7    Smokeless tobacco: Never   Substance Use Topics    Alcohol use: No     Comment: former heavy drinker       No Known Allergies    Prior to Admission medications    Medication Sig Start Date End Date Taking? Authorizing Provider   sacubitriL-valsartan Conner Mcclure) 24-26 mg tablet Take 1 Tablet by mouth two (2) times a day. 22  Yes Angelica Kim MD   INSULIN DEGLUDEC SC by SubCUTAneous route. Yes Provider, Historical   carvediloL (COREG) 6.25 mg tablet TAKE 1 TABLET BY MOUTH TWICE A DAY WITH MEALS 22  Yes Imani Liang NP   rosuvastatin (CRESTOR) 10 mg tablet Take 10 mg by mouth nightly. Yes Provider, Historical   insulin glargine (LANTUS,BASAGLAR) 100 unit/mL (3 mL) inpn by SubCUTAneous route.    Yes Provider, Historical   digoxin (LANOXIN) 0.125 mg tablet TAKE 1 TABLET BY MOUTH EVERY DAY 20  Yes Jonathon Napoles MD   spironolactone (ALDACTONE) 25 mg tablet Take 1 Tab by mouth daily. 11/4/19  Yes Imani Liang NP   furosemide (LASIX) 40 mg tablet Take 40 mg by mouth daily. Yes Provider, Historical   aspirin 81 mg chewable tablet Take 1 Tab by mouth daily. 6/22/18  Yes Moreno Lockett MD   metFORMIN (GLUCOPHAGE) 1,000 mg tablet Take 1,000 mg by mouth two (2) times daily (with meals). Yes Provider, Historical             Visit Vitals  /82   Pulse 80   Ht 5' 9\" (1.753 m)   Wt 99.3 kg (219 lb)   SpO2 99%   BMI 32.34 kg/m²         Physical Exam  Constitutional:       Appearance: He is well-developed. HENT:      Head: Normocephalic and atraumatic. Eyes:      Conjunctiva/sclera: Conjunctivae normal.   Neck:      Thyroid: No thyromegaly. Vascular: No JVD. Trachea: No tracheal deviation. Cardiovascular:      Rate and Rhythm: Normal rate and regular rhythm. Heart sounds: Normal heart sounds. No murmur heard. No friction rub. No gallop. Pulmonary:      Effort: No respiratory distress. Breath sounds: No wheezing or rales. Chest:      Chest wall: No tenderness. Abdominal:      Palpations: Abdomen is soft. Tenderness: There is no abdominal tenderness. Musculoskeletal:      Cervical back: Neck supple. Skin:     General: Skin is warm and dry. Neurological:      Mental Status: He is alert and oriented to person, place, and time. Comments: stuttering       Mr. Mora Watt has a reminder for a \"due or due soon\" health maintenance. I have asked that he contact his primary care provider for follow-up on this health maintenance. 6/2018  I have personally reviewed patient's records available from hospital and other providers and incorporated findings in patient care. I have personally reviewed patients ekg done at other facility. I Have personally reviewed recent relevant labs available and discussed with patient  Interpretation Summary 8/2018  Left ventricular severely decreased systolic function.  Calculated left ventricular ejection fraction is 15%. Left ventricular cavity size is severely dilated. Left ventricular global hypokinesis. Severe (grade 3) left ventricular diastolic dysfunction. Right ventricular cavity size is severely dilated. Right ventricular global systolic function is severely reduced. Right ventricular findings are consistent with Ramos's sign. Right atrial cavity size is severely dilated. Mild aortic valve sclerosis. Mild mitral valve regurgitation. Mild tricuspid valve regurgitation is present. Pulmonary arterial systolic pressure is 53 mmHg. Moderate pulmonary hypertension is present. Severely elevated central venous pressure (15+ mmHg); IVC diameter is larger than 21 mm and collapses less than 50% with respiration. Mild pulmonic valve regurgitation is present. Procedure Conclusion   Nuclear Stress Test 8/2018  Abnormal myocardial perfusion imaging. Fixed defect consistent with prior myocardial infarction. Myocardial perfusion imaging supports a high risk stress test.   There is a prior study available for comparison. Moderate size moderate intensity fixed defect of inferior wall. This is associated with severe global hypokinesis and severely reduced systolic function. This could indicate area of infarction in inferior wall but more likely represents nonischemic cardiomyopathy      Interpretation Summary 8/2018  Gated SPECT: Left ventricular function post-stress was abnormal. Calculated ejection fraction is 20%. Baseline ECG: Normal sinus rhythm, non-specific ST-T wave abnormalities. Inconclusive stress electrocardiogram.  Myocardial perfusion imaging defect 1: There is a defect that is moderate in size with a moderate reduction in uptake present in the apical to basal inferior location(s) that is non-reversible. There is abnormal wall motion in the defect area. Viability in the area is poor. The defect appears to be infarction. Abnormal myocardial perfusion imaging.  Fixed defect consistent with prior myocardial infarction. Myocardial perfusion imaging supports a high risk stress test.         Conclusion 8/2018-cardiac catheterization     Normal epicardial coronary arteries  Normal filling pressures today with LVEDP 8, PCW 7 mean, RA mean 3 and RVEDP of 2      Medical treatment for nonischemic cardiomyopathy      10/2018  Echocardiogram exam  Left ventricular severely decreased systolic function. Estimated left ventricular ejection fraction is 21 - 25%. Left ventricular cavity size is dilated. Left ventricular global hypokinesis. Severe (grade 3) left ventricular diastolic dysfunction. Left atrial cavity size is severely dilated. Right ventricular cavity size is severely dilated. Right atrial cavity size is moderately dilated. Mild aortic valve focal thickening present. Mild mitral valve regurgitation. Mild tricuspid valve regurgitation is present. Mild pulmonary hypertension is present. Estimated pulmonary artery systolic pressure is 48 mmHg. Mild pulmonic valve regurgitation is present. Mildly elevated central venous pressure (5-10 mmHg); IVC diameter is less than 21 mm and collapses less than 50% with respiration. 5/2022  Echo  Interpretation Summary    Left Ventricle: Severely reduced left ventricular systolic function with a visually estimated EF of 20 - 25%. Left ventricle size is normal. Normal wall thickness. Global hypokinesis present. Normal diastolic function. Right Ventricle: Reduced systolic function. Aorta: Mildly dilated aortic root. Comparison Study Information  Prior Study  There is a prior study available for comparison. Prior study date: 10/26/2018. Assessment         ICD-10-CM ICD-9-CM    1. Systolic CHF, chronic (HCC)  S70.98 479.51 METABOLIC PANEL, BASIC     428.0     Stable compensated class II continue treatment      2. Cardiomyopathy, unspecified type ([facility]  G65.6 146.1 METABOLIC PANEL, BASIC    Stable continue current treatment      3.  Primary hypertension  I10 401.9     Stable monitor      4. Hyperlipidemia, unspecified hyperlipidemia type  E78.5 272.4     Continue treatment lab with PCP      5. Type 2 diabetes mellitus with other diabetic kidney complication, with long-term current use of insulin ([facility])  E11.29 250.40     Z79.4 V58.67     Continue current treatment monitor with PCP      7/2018  Symptoms suggestive of congestive heart failure. Continue with the Lasix. Have not added any ACE inhibitor due to low blood pressure. Will get echo and stress test.  Further plans based on above. 8/2018  Worsening congestive heart failure and cardiomyopathy. Abnormal nuclear scan. Will proceed with cardiac catheterization with possible PCI. Symptoms of CHF appears to be improving. Blood pressure low normal so I would not increase any medication at present. 9/2018  Nonischemic cardiomyopathy no significant CAD. CHF compensated class II-III. Due to low normal blood pressure will not increase any medication at this point. Patient had nausea and vomiting after getting contrast injection. It has resolved. Will recheck echo and decide on ICD  11/2018  Cardiomyopathy remains severe. CHF class III. Patient has stopped all his cardiac medication. I have restarted and instructed on compliance. Discussed ICD. Wants To hold off at this time  2/2019  Nonischemic cardiomyopathy clinically compensated. Class II. Symptoms are improving. Does not want ICD at present  11/2019  Cardiac status stable. Compensated CHF. Does not want ICD. Does not want to change lisinopril to [facility]. Continue current therapy  5/2022  Patient reevaluated today. Last seen in 2019. We will recheck echo. Check digoxin level. At present does not want ICD. Consider [facility]  6/2022  Patient seen in follow-up for history of cardiomyopathy. CHF appears clinically compensated echo reviewed and discussed with patient LV function 20-25%.   Discussed ICD, patient continues to not want at this time.  Will increase carvedilol to 6.25 mg twice daily. Follow-up in 2 weeks we will consider adding Entresto at that time. Digoxin level is low however patient is asymptomatic we will continue all other medications  8/2022  CHF compensated class II. EF remains low. Change lisinopril to Entresto. Increase dose as tolerated. Consider Jardiance if blood pressure and renal function remained stable        Medications Discontinued During This Encounter   Medication Reason    lisinopriL (PRINIVIL, ZESTRIL) 5 mg tablet Alternate Therapy       Orders Placed This Encounter    METABOLIC PANEL, BASIC     Standing Status:   Future     Standing Expiration Date:   9/17/2022    sacubitriL-valsartan (Entresto) 24-26 mg tablet     Sig: Take 1 Tablet by mouth two (2) times a day. Dispense:  180 Tablet     Refill:  3       Follow-up and Dispositions    Return in about 2 months (around 10/18/2022) for Follow-up with Jayda Talley.

## 2022-10-24 ENCOUNTER — OFFICE VISIT (OUTPATIENT)
Dept: CARDIOLOGY CLINIC | Age: 67
End: 2022-10-24
Payer: MEDICARE

## 2022-10-24 VITALS
HEIGHT: 69 IN | SYSTOLIC BLOOD PRESSURE: 102 MMHG | OXYGEN SATURATION: 100 % | WEIGHT: 217 LBS | DIASTOLIC BLOOD PRESSURE: 69 MMHG | BODY MASS INDEX: 32.14 KG/M2 | HEART RATE: 80 BPM

## 2022-10-24 DIAGNOSIS — I50.22 SYSTOLIC CHF, CHRONIC (HCC): Primary | ICD-10-CM

## 2022-10-24 DIAGNOSIS — I42.9 CARDIOMYOPATHY, UNSPECIFIED TYPE (HCC): ICD-10-CM

## 2022-10-24 DIAGNOSIS — Z79.4 TYPE 2 DIABETES MELLITUS WITH OTHER DIABETIC KIDNEY COMPLICATION, WITH LONG-TERM CURRENT USE OF INSULIN (HCC): ICD-10-CM

## 2022-10-24 DIAGNOSIS — I10 PRIMARY HYPERTENSION: ICD-10-CM

## 2022-10-24 DIAGNOSIS — E11.29 TYPE 2 DIABETES MELLITUS WITH OTHER DIABETIC KIDNEY COMPLICATION, WITH LONG-TERM CURRENT USE OF INSULIN (HCC): ICD-10-CM

## 2022-10-24 DIAGNOSIS — E78.5 HYPERLIPIDEMIA, UNSPECIFIED HYPERLIPIDEMIA TYPE: ICD-10-CM

## 2022-10-24 PROCEDURE — G8754 DIAS BP LESS 90: HCPCS | Performed by: NURSE PRACTITIONER

## 2022-10-24 PROCEDURE — G8536 NO DOC ELDER MAL SCRN: HCPCS | Performed by: NURSE PRACTITIONER

## 2022-10-24 PROCEDURE — G8752 SYS BP LESS 140: HCPCS | Performed by: NURSE PRACTITIONER

## 2022-10-24 PROCEDURE — 99214 OFFICE O/P EST MOD 30 MIN: CPT | Performed by: NURSE PRACTITIONER

## 2022-10-24 PROCEDURE — 3017F COLORECTAL CA SCREEN DOC REV: CPT | Performed by: NURSE PRACTITIONER

## 2022-10-24 PROCEDURE — 2022F DILAT RTA XM EVC RTNOPTHY: CPT | Performed by: NURSE PRACTITIONER

## 2022-10-24 PROCEDURE — 1123F ACP DISCUSS/DSCN MKR DOCD: CPT | Performed by: NURSE PRACTITIONER

## 2022-10-24 PROCEDURE — G8417 CALC BMI ABV UP PARAM F/U: HCPCS | Performed by: NURSE PRACTITIONER

## 2022-10-24 PROCEDURE — 1101F PT FALLS ASSESS-DOCD LE1/YR: CPT | Performed by: NURSE PRACTITIONER

## 2022-10-24 PROCEDURE — G8427 DOCREV CUR MEDS BY ELIG CLIN: HCPCS | Performed by: NURSE PRACTITIONER

## 2022-10-24 PROCEDURE — G8432 DEP SCR NOT DOC, RNG: HCPCS | Performed by: NURSE PRACTITIONER

## 2022-10-24 PROCEDURE — 3046F HEMOGLOBIN A1C LEVEL >9.0%: CPT | Performed by: NURSE PRACTITIONER

## 2022-10-24 NOTE — PROGRESS NOTES
HISTORY OF PRESENT ILLNESS  Angelito Lyons is a 78 y.o. male. 5/12/2022  Patient is reevaluated today. He was last seen in 2019. History of systolic CHF, CAD, hypertension and cardiomyopathy. 6/2022  Patient seen in f/u for testing results. He denies chest pain, shortness of breath, palpitations or edema. 10/2022  Patient with history of cardiomyopathy seen in follow up. He denies chest pain, shortness of breath, palpitations or edema. He reports is taking all medications consistently. He did not have blood work drawn prior to today's appointment. Follow-up  The history is provided by the Patient and medical records. Pertinent negatives include no chest pain, no abdominal pain, no headaches and no shortness of breath. Cardiomyopathy  The history is provided by the Patient. This is a new problem. The problem occurs constantly. The problem has been gradually worsening. Pertinent negatives include no chest pain, no abdominal pain, no headaches and no shortness of breath. Shortness of Breath  The history is provided by the Patient. This is a new problem. The problem occurs intermittently. The problem has been gradually improving. Pertinent negatives include no fever, no headaches, no cough, no sputum production, no hemoptysis, no wheezing, no PND, no orthopnea, no chest pain, no vomiting, no abdominal pain, no rash, no leg swelling and no claudication. The problem's precipitants include exercise. Review of Systems   Constitutional:  Negative for chills and fever. HENT:  Negative for nosebleeds. Eyes:  Negative for blurred vision and double vision. Respiratory:  Negative for cough, hemoptysis, sputum production, shortness of breath and wheezing. Cardiovascular:  Negative for chest pain, palpitations, orthopnea, claudication, leg swelling and PND. Gastrointestinal:  Negative for abdominal pain, heartburn, nausea and vomiting. Musculoskeletal:  Negative for myalgias.    Skin: Negative for rash. Neurological:  Negative for dizziness, weakness and headaches. Endo/Heme/Allergies:  Does not bruise/bleed easily. Family History   Problem Relation Age of Onset    Heart Disease Mother     No Known Problems Father     Heart Disease Brother         enlarged heart       Past Medical History:   Diagnosis Date    Arthritis     Cardiomyopathy ([facility] [place] [address] 2018    EF reported 45-50% in     Chest pain 2018    Will anginal pain. Less likely GERD chest wall    Hyperlipidemia 2018    On statin. Lab with PCP    Nonspecific abnormal electrocardiogram (ECG) (EKG)     Obesity, unspecified     Other and unspecified hyperlipidemia     Other primary cardiomyopathies     Shortness of breath     SOB (shortness of breath) 2018    Possible CHF, HCVD, angina equivalent. Systolic CHF, chronic ([facility] [place] [address] 2018    class2-3     Type 2 diabetes mellitus without complication, without long-term current use of insulin ([facility] [place] [address] 2018    On metformin. Lab with PCP    Type II or unspecified type diabetes mellitus without mention of complication, not stated as uncontrolled        History reviewed. No pertinent surgical history. Social History     Tobacco Use    Smoking status: Former     Types: Cigarettes     Quit date: 1998     Years since quittin.8    Smokeless tobacco: Never   Substance Use Topics    Alcohol use: No     Comment: former heavy drinker       No Known Allergies    Prior to Admission medications    Medication Sig Start Date End Date Taking? Authorizing Provider   sacubitriL-valsartan Kavita Sutton) 24-26 mg tablet Take 1 Tablet by mouth two (2) times a day. 22  Yes Amada Wagner MD   INSULIN DEGLUDEC SC by SubCUTAneous route. Yes Provider, Historical   carvediloL (COREG) 6.25 mg tablet TAKE 1 TABLET BY MOUTH TWICE A DAY WITH MEALS  Patient taking differently: 3.125 mg.  TAKE 1 TABLET BY MOUTH TWICE A DAY WITH MEALS 22  Yes Imani Liang NP   rosuvastatin (CRESTOR) 10 mg tablet Take 10 mg by mouth nightly. Yes Provider, Historical   insulin glargine (LANTUS,BASAGLAR) 100 unit/mL (3 mL) inpn by SubCUTAneous route. Yes Provider, Historical   digoxin (LANOXIN) 0.125 mg tablet TAKE 1 TABLET BY MOUTH EVERY DAY 8/25/20  Yes Citlalli Casillas MD   spironolactone (ALDACTONE) 25 mg tablet Take 1 Tab by mouth daily. 11/4/19  Yes Imani Liang NP   furosemide (LASIX) 40 mg tablet Take 40 mg by mouth daily. Yes Provider, Historical   metFORMIN (GLUCOPHAGE) 1,000 mg tablet Take 1,000 mg by mouth two (2) times daily (with meals). Yes Provider, Historical   aspirin 81 mg chewable tablet Take 1 Tab by mouth daily. Patient not taking: Reported on 10/24/2022 6/22/18   Justin Reyes MD             Visit Vitals  /69   Pulse 80   Ht 5' 9\" (1.753 m)   Wt 98.4 kg (217 lb)   SpO2 100%   BMI 32.05 kg/m²           Physical Exam  Vitals and nursing note reviewed. Constitutional:       Appearance: He is well-developed. HENT:      Head: Normocephalic and atraumatic. Eyes:      Conjunctiva/sclera: Conjunctivae normal.   Neck:      Thyroid: No thyromegaly. Vascular: No JVD. Trachea: No tracheal deviation. Cardiovascular:      Rate and Rhythm: Normal rate and regular rhythm. Heart sounds: Normal heart sounds. No murmur heard. No friction rub. No gallop. Pulmonary:      Effort: No respiratory distress. Breath sounds: No wheezing or rales. Chest:      Chest wall: No tenderness. Abdominal:      Palpations: Abdomen is soft. Tenderness: There is no abdominal tenderness. Musculoskeletal:      Cervical back: Neck supple. Right lower leg: No edema. Left lower leg: No edema. Skin:     General: Skin is warm and dry. Neurological:      Mental Status: He is alert and oriented to person, place, and time. Comments: stuttering       Mr. Lurdes Garcia has a reminder for a \"due or due soon\" health maintenance.  I have asked that he contact his primary care provider for follow-up on this health maintenance. 6/2018  I have personally reviewed patient's records available from hospital and other providers and incorporated findings in patient care. I have personally reviewed patients ekg done at other facility. I Have personally reviewed recent relevant labs available and discussed with patient  Interpretation Summary 8/2018  Left ventricular severely decreased systolic function. Calculated left ventricular ejection fraction is 15%. Left ventricular cavity size is severely dilated. Left ventricular global hypokinesis. Severe (grade 3) left ventricular diastolic dysfunction. Right ventricular cavity size is severely dilated. Right ventricular global systolic function is severely reduced. Right ventricular findings are consistent with Ramos's sign. Right atrial cavity size is severely dilated. Mild aortic valve sclerosis. Mild mitral valve regurgitation. Mild tricuspid valve regurgitation is present. Pulmonary arterial systolic pressure is 53 mmHg. Moderate pulmonary hypertension is present. Severely elevated central venous pressure (15+ mmHg); IVC diameter is larger than 21 mm and collapses less than 50% with respiration. Mild pulmonic valve regurgitation is present. Procedure Conclusion   Nuclear Stress Test 8/2018  Abnormal myocardial perfusion imaging. Fixed defect consistent with prior myocardial infarction. Myocardial perfusion imaging supports a high risk stress test.   There is a prior study available for comparison. Moderate size moderate intensity fixed defect of inferior wall. This is associated with severe global hypokinesis and severely reduced systolic function. This could indicate area of infarction in inferior wall but more likely represents nonischemic cardiomyopathy      Interpretation Summary 8/2018  Gated SPECT: Left ventricular function post-stress was abnormal. Calculated ejection fraction is 20%.   Baseline ECG: Normal sinus rhythm, non-specific ST-T wave abnormalities. Inconclusive stress electrocardiogram.  Myocardial perfusion imaging defect 1: There is a defect that is moderate in size with a moderate reduction in uptake present in the apical to basal inferior location(s) that is non-reversible. There is abnormal wall motion in the defect area. Viability in the area is poor. The defect appears to be infarction. Abnormal myocardial perfusion imaging. Fixed defect consistent with prior myocardial infarction. Myocardial perfusion imaging supports a high risk stress test.         Conclusion 8/2018-cardiac catheterization     Normal epicardial coronary arteries  Normal filling pressures today with LVEDP 8, PCW 7 mean, RA mean 3 and RVEDP of 2      Medical treatment for nonischemic cardiomyopathy      10/2018  Echocardiogram exam  Left ventricular severely decreased systolic function. Estimated left ventricular ejection fraction is 21 - 25%. Left ventricular cavity size is dilated. Left ventricular global hypokinesis. Severe (grade 3) left ventricular diastolic dysfunction. Left atrial cavity size is severely dilated. Right ventricular cavity size is severely dilated. Right atrial cavity size is moderately dilated. Mild aortic valve focal thickening present. Mild mitral valve regurgitation. Mild tricuspid valve regurgitation is present. Mild pulmonary hypertension is present. Estimated pulmonary artery systolic pressure is 48 mmHg. Mild pulmonic valve regurgitation is present. Mildly elevated central venous pressure (5-10 mmHg); IVC diameter is less than 21 mm and collapses less than 50% with respiration. 5/2022  Echo  Interpretation Summary    Left Ventricle: Severely reduced left ventricular systolic function with a visually estimated EF of 20 - 25%. Left ventricle size is normal. Normal wall thickness. Global hypokinesis present. Normal diastolic function. Right Ventricle: Reduced systolic function. Aorta: Mildly dilated aortic root. Comparison Study Information  Prior Study  There is a prior study available for comparison. Prior study date: 10/26/2018. Assessment         ICD-10-CM ICD-9-CM    1. Systolic CHF, chronic (HCC)  I50.22 428.22      428.0     Stable compensated class II continue treatment      2. Cardiomyopathy, unspecified type [facility]  I42.9 425.4     Stable continue current treatment      3. Primary hypertension  I10 401.9     Stable monitor      4. Hyperlipidemia, unspecified hyperlipidemia type  E78.5 272.4      Continue treatment lab with PCP          5. Type 2 diabetes mellitus with other diabetic kidney complication, with long-term current use of insulin ([facility])  E11.29 250.40     Z79.4 V58.67     Continue current treatment monitor with PCP      7/2018  Symptoms suggestive of congestive heart failure. Continue with the Lasix. Have not added any ACE inhibitor due to low blood pressure. Will get echo and stress test.  Further plans based on above. 8/2018  Worsening congestive heart failure and cardiomyopathy. Abnormal nuclear scan. Will proceed with cardiac catheterization with possible PCI. Symptoms of CHF appears to be improving. Blood pressure low normal so I would not increase any medication at present. 9/2018  Nonischemic cardiomyopathy no significant CAD. CHF compensated class II-III. Due to low normal blood pressure will not increase any medication at this point. Patient had nausea and vomiting after getting contrast injection. It has resolved. Will recheck echo and decide on ICD  11/2018  Cardiomyopathy remains severe. CHF class III. Patient has stopped all his cardiac medication. I have restarted and instructed on compliance. Discussed ICD. Wants To hold off at this time  2/2019  Nonischemic cardiomyopathy clinically compensated. Class II. Symptoms are improving. Does not want ICD at present  11/2019  Cardiac status stable. Compensated CHF.   Does not want ICD.  Does not want to change lisinopril to [facility]. Continue current therapy  5/2022  Patient reevaluated today. Last seen in 2019. We will recheck echo. Check digoxin level. At present does not want ICD. Consider [facility]  6/2022  Patient seen in follow-up for history of cardiomyopathy. CHF appears clinically compensated echo reviewed and discussed with patient LV function 20-25%. Discussed ICD, patient continues to not want at this time. Will increase carvedilol to 6.25 mg twice daily. Follow-up in 2 weeks we will consider adding Entresto at that time. Digoxin level is low however patient is asymptomatic we will continue all other medications  8/2022  CHF compensated class II. EF remains low. Change lisinopril to Entresto. Increase dose as tolerated. Consider Jardiance if blood pressure and renal function remained stable  10/2022  Patient with h/o cardiomyopathy CHF appears clinically compensated. Patient recently started on Entresto and is tolerating well. Advised to have BMP drawn as previously ordered. Discussed ICD primary prevention,- patient does not want. Blood pressure is controlled on low side in office today unable to further uptitrate medications. There are no discontinued medications. No orders of the defined types were placed in this encounter. Follow-up and Dispositions    Return in about 3 months (around 1/24/2023) for Follow up with Dr. Nhan Holly.

## (undated) DEVICE — PROCEDURE KIT FLUID MGMT 10 FR CUST MAINFOLD

## (undated) DEVICE — 6FR THERMODILUTION BALLOON CATHETER SWAN (ARROW)

## (undated) DEVICE — INTRODUCER SHTH 6FR CANN L11CM DIL TIP 35MM GRN TUNGSTEN

## (undated) DEVICE — CATHETER DIAG AD 4FR L100CM COR RED HYDRPHLC NYL AR I MOD

## (undated) DEVICE — PACK PROCEDURE SURG VASC CATH 161 MMC LF

## (undated) DEVICE — SET FLD ADMIN 3 W STPCOCK FIX FEM L BOR 1IN

## (undated) DEVICE — CATHETER ANGIO 4FR L100CM S STL NYL JL4 3 SEG BRAID SFT

## (undated) DEVICE — PRESSURE MONITORING SET: Brand: TRUWAVE

## (undated) DEVICE — INTRODUCER SHTH 4FR CANN L11CM DIL TIP 25MM RED TUNGSTEN